# Patient Record
Sex: FEMALE | Race: BLACK OR AFRICAN AMERICAN | NOT HISPANIC OR LATINO | URBAN - METROPOLITAN AREA
[De-identification: names, ages, dates, MRNs, and addresses within clinical notes are randomized per-mention and may not be internally consistent; named-entity substitution may affect disease eponyms.]

---

## 2022-04-04 ENCOUNTER — INPATIENT (INPATIENT)
Facility: HOSPITAL | Age: 67
LOS: 0 days | Discharge: ANOTHER HOSPITAL | End: 2022-04-05
Attending: INTERNAL MEDICINE | Admitting: INTERNAL MEDICINE
Payer: COMMERCIAL

## 2022-04-04 VITALS
SYSTOLIC BLOOD PRESSURE: 195 MMHG | OXYGEN SATURATION: 100 % | WEIGHT: 139.99 LBS | DIASTOLIC BLOOD PRESSURE: 110 MMHG | TEMPERATURE: 98 F | RESPIRATION RATE: 17 BRPM | HEIGHT: 65 IN | HEART RATE: 66 BPM

## 2022-04-04 PROCEDURE — 99291 CRITICAL CARE FIRST HOUR: CPT

## 2022-04-04 PROCEDURE — 93010 ELECTROCARDIOGRAM REPORT: CPT

## 2022-04-04 NOTE — ED ADULT TRIAGE NOTE - WEIGHT IN LBS
Lots of fluids and rest. Ibuprofen and/or Tylenol for fever and discomfort. Take the Xofluza once today. No school/work until symptoms of fever and body aches have been gone for 24 hours without medications. Symptomatic care with warm salt water gargles, throat lozenges, honey, tea, lemon, chloraseptic spray, lots of fluids, cool humidified air, OTC tylenol and ibuprofen. Wash hands, cover cough. Avoid the elderly, young, immunocompromised and pregnant.      Patient Education     Influenza (Adult)    Influenza is also called the flu. It is a viral illness that affects the air passages of your lungs. It is different from the common cold. The flu can easily be passed from one to person to another. It may be spread through the air by coughing and sneezing. Or it can be spread by touching the sick person and then touching your own eyes, nose, or mouth.  The flu starts 1 to 3 days after you are exposed to the flu virus. It may last for 1 to 2 weeks but many people feel tired or fatigued for many weeks afterward. You usually don’t need to take antibiotics unless you have a complication. This might be an ear or sinus infection or pneumonia.  Symptoms of the flu may be mild or severe. They can include extreme tiredness (wanting to stay in bed all day), chills, fevers, muscle aches, soreness with eye movement, headache, and a dry, hacking cough.  Home care  Follow these guidelines when caring for yourself at home:  · Avoid being around cigarette smoke, whether yours or other people’s.  · Acetaminophen or ibuprofen will help ease your fever, muscle aches, and headache. Don’t give aspirin to anyone younger than 18 who has the flu. Aspirin can harm the liver.  · Nausea and loss of appetite are common with the flu. Eat light meals. Drink 6 to 8 glasses of liquids every day. Good choices are water, sport drinks, soft drinks without caffeine, juices, tea, and soup. Extra fluids will also help loosen secretions in your nose and  lungs.  · Over-the-counter cold medicines will not make the flu go away faster. But the medicines may help with coughing, sore throat, and congestion in your nose and sinuses. Don’t use a decongestant if you have high blood pressure.  · Stay home until your fever has been gone for at least 24 hours without using medicine to reduce fever.  Follow-up care  Follow up with your healthcare provider, or as advised, if you are not getting better over the next week.  If you are age 65 or older, talk with your provider about getting a pneumococcal vaccine every 5 years. You should also get this vaccine if you have chronic asthma or COPD. All adults should get a flu vaccine every fall. Ask your provider about this.  When to seek medical advice  Call your healthcare provider right away if any of these occur:  · Cough with lots of colored mucus (sputum) or blood in your mucus  · Chest pain, shortness of breath, wheezing, or trouble breathing  · Severe headache, or face, neck, or ear pain  · New rash with fever  · Fever of 100.4°F (38°C) or higher, or as directed by your healthcare provider  · Confusion, behavior change, or seizure  · Severe weakness or dizziness  · You get a new fever or cough after getting better for a few days  Date Last Reviewed: 1/1/2017  © 8787-7129 [employer]. [address] [place] [address], [place] [address]. All rights reserved. This information is not intended as a substitute for professional medical care. Always follow your healthcare professional's instructions.            139.9

## 2022-04-05 ENCOUNTER — TRANSCRIPTION ENCOUNTER (OUTPATIENT)
Age: 67
End: 2022-04-05

## 2022-04-05 ENCOUNTER — INPATIENT (INPATIENT)
Facility: HOSPITAL | Age: 67
LOS: 0 days | Discharge: HOME | DRG: 247 | End: 2022-04-06
Attending: INTERNAL MEDICINE | Admitting: INTERNAL MEDICINE
Payer: COMMERCIAL

## 2022-04-05 VITALS
DIASTOLIC BLOOD PRESSURE: 74 MMHG | HEART RATE: 59 BPM | RESPIRATION RATE: 16 BRPM | TEMPERATURE: 98 F | SYSTOLIC BLOOD PRESSURE: 120 MMHG | WEIGHT: 134.92 LBS | HEIGHT: 65 IN | OXYGEN SATURATION: 98 %

## 2022-04-05 VITALS
DIASTOLIC BLOOD PRESSURE: 74 MMHG | TEMPERATURE: 98 F | SYSTOLIC BLOOD PRESSURE: 123 MMHG | OXYGEN SATURATION: 98 % | HEART RATE: 72 BPM | RESPIRATION RATE: 18 BRPM

## 2022-04-05 DIAGNOSIS — Z90.49 ACQUIRED ABSENCE OF OTHER SPECIFIED PARTS OF DIGESTIVE TRACT: Chronic | ICD-10-CM

## 2022-04-05 DIAGNOSIS — R07.9 CHEST PAIN, UNSPECIFIED: ICD-10-CM

## 2022-04-05 DIAGNOSIS — I21.4 NON-ST ELEVATION (NSTEMI) MYOCARDIAL INFARCTION: ICD-10-CM

## 2022-04-05 DIAGNOSIS — I10 ESSENTIAL (PRIMARY) HYPERTENSION: ICD-10-CM

## 2022-04-05 LAB
ALBUMIN SERPL ELPH-MCNC: 4.2 G/DL — SIGNIFICANT CHANGE UP (ref 3.3–5)
ALP SERPL-CCNC: 122 U/L — HIGH (ref 40–120)
ALT FLD-CCNC: 32 U/L — SIGNIFICANT CHANGE UP (ref 12–78)
ANION GAP SERPL CALC-SCNC: 6 MMOL/L — SIGNIFICANT CHANGE UP (ref 5–17)
APTT BLD: 34.9 SEC — SIGNIFICANT CHANGE UP (ref 27.5–35.5)
APTT BLD: 79.4 SEC — HIGH (ref 27.5–35.5)
AST SERPL-CCNC: 28 U/L — SIGNIFICANT CHANGE UP (ref 15–37)
BASOPHILS # BLD AUTO: 0.05 K/UL — SIGNIFICANT CHANGE UP (ref 0–0.2)
BASOPHILS NFR BLD AUTO: 0.6 % — SIGNIFICANT CHANGE UP (ref 0–2)
BILIRUB SERPL-MCNC: 0.2 MG/DL — SIGNIFICANT CHANGE UP (ref 0.2–1.2)
BUN SERPL-MCNC: 16 MG/DL — SIGNIFICANT CHANGE UP (ref 7–23)
CALCIUM SERPL-MCNC: 9.3 MG/DL — SIGNIFICANT CHANGE UP (ref 8.5–10.1)
CHLORIDE SERPL-SCNC: 109 MMOL/L — HIGH (ref 96–108)
CO2 SERPL-SCNC: 25 MMOL/L — SIGNIFICANT CHANGE UP (ref 22–31)
CREAT SERPL-MCNC: 0.94 MG/DL — SIGNIFICANT CHANGE UP (ref 0.5–1.3)
EGFR: 67 ML/MIN/1.73M2 — SIGNIFICANT CHANGE UP
EOSINOPHIL # BLD AUTO: 0.1 K/UL — SIGNIFICANT CHANGE UP (ref 0–0.5)
EOSINOPHIL NFR BLD AUTO: 1.2 % — SIGNIFICANT CHANGE UP (ref 0–6)
FLUAV AG NPH QL: SIGNIFICANT CHANGE UP
FLUBV AG NPH QL: SIGNIFICANT CHANGE UP
GLUCOSE SERPL-MCNC: 105 MG/DL — HIGH (ref 70–99)
HCT VFR BLD CALC: 38 % — SIGNIFICANT CHANGE UP (ref 34.5–45)
HCT VFR BLD CALC: 38.9 % — SIGNIFICANT CHANGE UP (ref 34.5–45)
HGB BLD-MCNC: 12.4 G/DL — SIGNIFICANT CHANGE UP (ref 11.5–15.5)
HGB BLD-MCNC: 12.9 G/DL — SIGNIFICANT CHANGE UP (ref 11.5–15.5)
IMM GRANULOCYTES NFR BLD AUTO: 0.1 % — SIGNIFICANT CHANGE UP (ref 0–1.5)
INR BLD: 0.99 RATIO — SIGNIFICANT CHANGE UP (ref 0.88–1.16)
LYMPHOCYTES # BLD AUTO: 2.37 K/UL — SIGNIFICANT CHANGE UP (ref 1–3.3)
LYMPHOCYTES # BLD AUTO: 28.5 % — SIGNIFICANT CHANGE UP (ref 13–44)
MCHC RBC-ENTMCNC: 29.3 PG — SIGNIFICANT CHANGE UP (ref 27–34)
MCHC RBC-ENTMCNC: 29.5 PG — SIGNIFICANT CHANGE UP (ref 27–34)
MCHC RBC-ENTMCNC: 32.6 G/DL — SIGNIFICANT CHANGE UP (ref 32–36)
MCHC RBC-ENTMCNC: 33.2 G/DL — SIGNIFICANT CHANGE UP (ref 32–36)
MCV RBC AUTO: 88.8 FL — SIGNIFICANT CHANGE UP (ref 80–100)
MCV RBC AUTO: 89.8 FL — SIGNIFICANT CHANGE UP (ref 80–100)
MONOCYTES # BLD AUTO: 0.63 K/UL — SIGNIFICANT CHANGE UP (ref 0–0.9)
MONOCYTES NFR BLD AUTO: 7.6 % — SIGNIFICANT CHANGE UP (ref 2–14)
NEUTROPHILS # BLD AUTO: 5.17 K/UL — SIGNIFICANT CHANGE UP (ref 1.8–7.4)
NEUTROPHILS NFR BLD AUTO: 62 % — SIGNIFICANT CHANGE UP (ref 43–77)
NRBC # BLD: 0 /100 WBCS — SIGNIFICANT CHANGE UP (ref 0–0)
NRBC # BLD: 0 /100 WBCS — SIGNIFICANT CHANGE UP (ref 0–0)
NT-PROBNP SERPL-SCNC: 217 PG/ML — HIGH (ref 0–125)
PLATELET # BLD AUTO: 326 K/UL — SIGNIFICANT CHANGE UP (ref 150–400)
PLATELET # BLD AUTO: 340 K/UL — SIGNIFICANT CHANGE UP (ref 150–400)
POTASSIUM SERPL-MCNC: 4 MMOL/L — SIGNIFICANT CHANGE UP (ref 3.5–5.3)
POTASSIUM SERPL-SCNC: 4 MMOL/L — SIGNIFICANT CHANGE UP (ref 3.5–5.3)
PROT SERPL-MCNC: 8.1 GM/DL — SIGNIFICANT CHANGE UP (ref 6–8.3)
PROTHROM AB SERPL-ACNC: 11.8 SEC — SIGNIFICANT CHANGE UP (ref 10.5–13.4)
RBC # BLD: 4.23 M/UL — SIGNIFICANT CHANGE UP (ref 3.8–5.2)
RBC # BLD: 4.38 M/UL — SIGNIFICANT CHANGE UP (ref 3.8–5.2)
RBC # FLD: 13.1 % — SIGNIFICANT CHANGE UP (ref 10.3–14.5)
RBC # FLD: 13.2 % — SIGNIFICANT CHANGE UP (ref 10.3–14.5)
SARS-COV-2 RNA SPEC QL NAA+PROBE: SIGNIFICANT CHANGE UP
SODIUM SERPL-SCNC: 140 MMOL/L — SIGNIFICANT CHANGE UP (ref 135–145)
TROPONIN I, HIGH SENSITIVITY RESULT: 153.7 NG/L — HIGH
TROPONIN I, HIGH SENSITIVITY RESULT: 279 NG/L — HIGH
WBC # BLD: 6.83 K/UL — SIGNIFICANT CHANGE UP (ref 3.8–10.5)
WBC # BLD: 8.33 K/UL — SIGNIFICANT CHANGE UP (ref 3.8–10.5)
WBC # FLD AUTO: 6.83 K/UL — SIGNIFICANT CHANGE UP (ref 3.8–10.5)
WBC # FLD AUTO: 8.33 K/UL — SIGNIFICANT CHANGE UP (ref 3.8–10.5)

## 2022-04-05 PROCEDURE — ZZZZZ: CPT

## 2022-04-05 PROCEDURE — 93306 TTE W/DOPPLER COMPLETE: CPT | Mod: 26

## 2022-04-05 PROCEDURE — 93454 CORONARY ARTERY ANGIO S&I: CPT | Mod: 26,59

## 2022-04-05 PROCEDURE — 99223 1ST HOSP IP/OBS HIGH 75: CPT

## 2022-04-05 PROCEDURE — 71045 X-RAY EXAM CHEST 1 VIEW: CPT | Mod: 26

## 2022-04-05 PROCEDURE — 99221 1ST HOSP IP/OBS SF/LOW 40: CPT

## 2022-04-05 PROCEDURE — 99152 MOD SED SAME PHYS/QHP 5/>YRS: CPT | Mod: 59

## 2022-04-05 PROCEDURE — 93010 ELECTROCARDIOGRAM REPORT: CPT | Mod: 76

## 2022-04-05 PROCEDURE — 92928 PRQ TCAT PLMT NTRAC ST 1 LES: CPT | Mod: LD

## 2022-04-05 RX ORDER — ATORVASTATIN CALCIUM 80 MG/1
40 TABLET, FILM COATED ORAL AT BEDTIME
Refills: 0 | Status: DISCONTINUED | OUTPATIENT
Start: 2022-04-05 | End: 2022-04-05

## 2022-04-05 RX ORDER — METOPROLOL TARTRATE 50 MG
12.5 TABLET ORAL
Refills: 0 | Status: DISCONTINUED | OUTPATIENT
Start: 2022-04-05 | End: 2022-04-05

## 2022-04-05 RX ORDER — ASPIRIN/CALCIUM CARB/MAGNESIUM 324 MG
81 TABLET ORAL DAILY
Refills: 0 | Status: DISCONTINUED | OUTPATIENT
Start: 2022-04-05 | End: 2022-04-05

## 2022-04-05 RX ORDER — HEPARIN SODIUM 5000 [USP'U]/ML
INJECTION INTRAVENOUS; SUBCUTANEOUS
Qty: 25000 | Refills: 0 | Status: DISCONTINUED | OUTPATIENT
Start: 2022-04-05 | End: 2022-04-05

## 2022-04-05 RX ORDER — HEPARIN SODIUM 5000 [USP'U]/ML
3800 INJECTION INTRAVENOUS; SUBCUTANEOUS EVERY 6 HOURS
Refills: 0 | Status: DISCONTINUED | OUTPATIENT
Start: 2022-04-05 | End: 2022-04-05

## 2022-04-05 RX ORDER — TICAGRELOR 90 MG/1
180 TABLET ORAL ONCE
Refills: 0 | Status: COMPLETED | OUTPATIENT
Start: 2022-04-05 | End: 2022-04-05

## 2022-04-05 RX ORDER — SODIUM CHLORIDE 9 MG/ML
250 INJECTION INTRAMUSCULAR; INTRAVENOUS; SUBCUTANEOUS
Refills: 0 | Status: DISCONTINUED | OUTPATIENT
Start: 2022-04-05 | End: 2022-04-05

## 2022-04-05 RX ORDER — TICAGRELOR 90 MG/1
90 TABLET ORAL
Refills: 0 | Status: DISCONTINUED | OUTPATIENT
Start: 2022-04-05 | End: 2022-04-06

## 2022-04-05 RX ORDER — NIFEDIPINE 30 MG
60 TABLET, EXTENDED RELEASE 24 HR ORAL DAILY
Refills: 0 | Status: DISCONTINUED | OUTPATIENT
Start: 2022-04-05 | End: 2022-04-05

## 2022-04-05 RX ORDER — ATORVASTATIN CALCIUM 80 MG/1
1 TABLET, FILM COATED ORAL
Qty: 30 | Refills: 0
Start: 2022-04-05 | End: 2022-05-04

## 2022-04-05 RX ORDER — METOPROLOL TARTRATE 50 MG
12.5 TABLET ORAL
Qty: 0 | Refills: 0 | DISCHARGE
Start: 2022-04-05

## 2022-04-05 RX ORDER — ASPIRIN/CALCIUM CARB/MAGNESIUM 324 MG
1 TABLET ORAL
Qty: 0 | Refills: 0 | DISCHARGE
Start: 2022-04-05

## 2022-04-05 RX ORDER — ASPIRIN/CALCIUM CARB/MAGNESIUM 324 MG
1 TABLET ORAL
Qty: 90 | Refills: 3
Start: 2022-04-05 | End: 2023-03-30

## 2022-04-05 RX ORDER — ASPIRIN/CALCIUM CARB/MAGNESIUM 324 MG
324 TABLET ORAL ONCE
Refills: 0 | Status: COMPLETED | OUTPATIENT
Start: 2022-04-05 | End: 2022-04-05

## 2022-04-05 RX ORDER — ASPIRIN/CALCIUM CARB/MAGNESIUM 324 MG
81 TABLET ORAL DAILY
Refills: 0 | Status: DISCONTINUED | OUTPATIENT
Start: 2022-04-05 | End: 2022-04-06

## 2022-04-05 RX ORDER — TICAGRELOR 90 MG/1
1 TABLET ORAL
Qty: 60 | Refills: 0
Start: 2022-04-05 | End: 2022-05-04

## 2022-04-05 RX ORDER — HEPARIN SODIUM 5000 [USP'U]/ML
0 INJECTION INTRAVENOUS; SUBCUTANEOUS
Qty: 0 | Refills: 0 | DISCHARGE
Start: 2022-04-05

## 2022-04-05 RX ORDER — SODIUM CHLORIDE 9 MG/ML
1000 INJECTION INTRAMUSCULAR; INTRAVENOUS; SUBCUTANEOUS
Refills: 0 | Status: DISCONTINUED | OUTPATIENT
Start: 2022-04-05 | End: 2022-04-06

## 2022-04-05 RX ORDER — NIFEDIPINE 30 MG
1 TABLET, EXTENDED RELEASE 24 HR ORAL
Qty: 0 | Refills: 0 | DISCHARGE
Start: 2022-04-05

## 2022-04-05 RX ORDER — SODIUM CHLORIDE 9 MG/ML
250 INJECTION INTRAMUSCULAR; INTRAVENOUS; SUBCUTANEOUS ONCE
Refills: 0 | Status: COMPLETED | OUTPATIENT
Start: 2022-04-05 | End: 2022-04-05

## 2022-04-05 RX ORDER — NIFEDIPINE 30 MG
60 TABLET, EXTENDED RELEASE 24 HR ORAL DAILY
Refills: 0 | Status: DISCONTINUED | OUTPATIENT
Start: 2022-04-05 | End: 2022-04-06

## 2022-04-05 RX ORDER — ATORVASTATIN CALCIUM 80 MG/1
40 TABLET, FILM COATED ORAL AT BEDTIME
Refills: 0 | Status: DISCONTINUED | OUTPATIENT
Start: 2022-04-05 | End: 2022-04-06

## 2022-04-05 RX ORDER — NIFEDIPINE 30 MG
1 TABLET, EXTENDED RELEASE 24 HR ORAL
Qty: 30 | Refills: 0
Start: 2022-04-05 | End: 2022-05-04

## 2022-04-05 RX ORDER — ATORVASTATIN CALCIUM 80 MG/1
1 TABLET, FILM COATED ORAL
Qty: 0 | Refills: 0 | DISCHARGE
Start: 2022-04-05

## 2022-04-05 RX ORDER — HEPARIN SODIUM 5000 [USP'U]/ML
3800 INJECTION INTRAVENOUS; SUBCUTANEOUS ONCE
Refills: 0 | Status: COMPLETED | OUTPATIENT
Start: 2022-04-05 | End: 2022-04-05

## 2022-04-05 RX ORDER — METOPROLOL TARTRATE 50 MG
12.5 TABLET ORAL
Refills: 0 | Status: DISCONTINUED | OUTPATIENT
Start: 2022-04-05 | End: 2022-04-06

## 2022-04-05 RX ADMIN — Medication 12.5 MILLIGRAM(S): at 17:40

## 2022-04-05 RX ADMIN — SODIUM CHLORIDE 250 MILLILITER(S): 9 INJECTION INTRAMUSCULAR; INTRAVENOUS; SUBCUTANEOUS at 12:27

## 2022-04-05 RX ADMIN — Medication 81 MILLIGRAM(S): at 16:52

## 2022-04-05 RX ADMIN — HEPARIN SODIUM 750 UNIT(S)/HR: 5000 INJECTION INTRAVENOUS; SUBCUTANEOUS at 04:59

## 2022-04-05 RX ADMIN — HEPARIN SODIUM 700 UNIT(S)/HR: 5000 INJECTION INTRAVENOUS; SUBCUTANEOUS at 10:51

## 2022-04-05 RX ADMIN — HEPARIN SODIUM 750 UNIT(S)/HR: 5000 INJECTION INTRAVENOUS; SUBCUTANEOUS at 02:36

## 2022-04-05 RX ADMIN — SODIUM CHLORIDE 75 MILLILITER(S): 9 INJECTION INTRAMUSCULAR; INTRAVENOUS; SUBCUTANEOUS at 16:54

## 2022-04-05 RX ADMIN — TICAGRELOR 180 MILLIGRAM(S): 90 TABLET ORAL at 10:47

## 2022-04-05 RX ADMIN — HEPARIN SODIUM 3800 UNIT(S): 5000 INJECTION INTRAVENOUS; SUBCUTANEOUS at 02:36

## 2022-04-05 RX ADMIN — HEPARIN SODIUM 750 UNIT(S)/HR: 5000 INJECTION INTRAVENOUS; SUBCUTANEOUS at 07:13

## 2022-04-05 RX ADMIN — ATORVASTATIN CALCIUM 40 MILLIGRAM(S): 80 TABLET, FILM COATED ORAL at 21:07

## 2022-04-05 RX ADMIN — TICAGRELOR 90 MILLIGRAM(S): 90 TABLET ORAL at 17:40

## 2022-04-05 RX ADMIN — HEPARIN SODIUM 750 UNIT(S)/HR: 5000 INJECTION INTRAVENOUS; SUBCUTANEOUS at 05:25

## 2022-04-05 RX ADMIN — Medication 324 MILLIGRAM(S): at 02:36

## 2022-04-05 RX ADMIN — Medication 12.5 MILLIGRAM(S): at 09:19

## 2022-04-05 RX ADMIN — Medication 60 MILLIGRAM(S): at 04:04

## 2022-04-05 NOTE — ED PROVIDER NOTE - CRITICAL CARE ATTENDING CONTRIBUTION TO CARE
Upon my evaluation, this patient had a high probability of imminent or lifethreatening deterioration due to NSTEMI/ACS, elevated troponin, EKG changes TWI, which required my direct attention, intervention, and personal management.     I have personally provided 34 minutes of critical care time exclusive of time spent on separately billable procedures. Time includes review of laboratory data, radiology results, discussion with consultants, and monitoring for potential decompensation. Interventions were performed as documented above.    - Mathew Cardoso MD, Emergency Medicine and Medical Toxicology Attending.

## 2022-04-05 NOTE — DISCHARGE NOTE PROVIDER - HOSPITAL COURSE
Patient is a 66F with no PMH who presents to the ED for chest pain.  Patient states that she was trying to goto sleep when she developed 6/10, substernal CP.  Pain was nonradiational, lasted about 10 minutes, not associated with palpitations, dyspnea, dizziness, lightheadedness, diaphoresis, or syncope.  Denies similar symptoms in the past.  Has had no recurrence of the pain.  Patient currently has no complaints.  Takes no daily medications, NKDA, SHx: cholecystectomy  Hypertensive in ED - 195/110.  Labs show mildly elevated troponin level. Patient received aspirin 324mg PO x1 and started on heparin gtt. EKG with TWI. troponin elevated. Cardiology consulted. for transfer to [facility] for cardiac cath with Dr. Hector. brilinta 180mg x1 load, atorvastatin 40mg PO QHS, metoprolol 12.5mg PO BID (HR60) started.    Patient is a 66F with no PMH who presents to the ED for chest pain.  Patient states that she was trying to goto sleep when she developed 6/10, substernal CP.  Pain was nonradiational, lasted about 10 minutes, not associated with palpitations, dyspnea, dizziness, lightheadedness, diaphoresis, or syncope.  Denies similar symptoms in the past.  Has had no recurrence of the pain.  Patient currently has no complaints.  Takes no daily medications, NKDA, SHx: cholecystectomy  Hypertensive in ED - 195/110.  Labs show mildly elevated troponin level. Patient received aspirin 324mg PO x1 and started on heparin gtt. EKG with TWI. troponin elevated. Cardiology consulted. for transfer to [facility] for cardiac cath with Dr. Hector. brilinta 180mg x1 load, atorvastatin 40mg PO QHS, metoprolol 12.5mg PO BID (HR60) started.   ASA 324mg given at 2AM.   Heparin bolus 3800units.

## 2022-04-05 NOTE — PATIENT PROFILE ADULT - FALL HARM RISK - TYPE OF ASSESSMENT
Birth Control Pills Pregnancy And Lactation Text: This medication should be avoided if pregnant and for the first 30 days post-partum. Erythromycin Counseling:  I discussed with the patient the risks of erythromycin including but not limited to GI upset, allergic reaction, drug rash, diarrhea, increase in liver enzymes, and yeast infections. Spironolactone Pregnancy And Lactation Text: This medication can cause feminization of the male fetus and should be avoided during pregnancy. The active metabolite is also found in breast milk. High Dose Vitamin A Pregnancy And Lactation Text: High dose vitamin A therapy is contraindicated during pregnancy and breast feeding. Azithromycin Pregnancy And Lactation Text: This medication is considered safe during pregnancy and is also secreted in breast milk. Admission Topical Retinoid counseling:  Patient advised to apply a pea-sized amount only at bedtime and wait 30 minutes after washing their face before applying.  If too drying, patient may add a non-comedogenic moisturizer. The patient verbalized understanding of the proper use and possible adverse effects of retinoids.  All of the patient's questions and concerns were addressed. Topical Clindamycin Pregnancy And Lactation Text: This medication is Pregnancy Category B and is considered safe during pregnancy. It is unknown if it is excreted in breast milk. Dapsone Counseling: I discussed with the patient the risks of dapsone including but not limited to hemolytic anemia, agranulocytosis, rashes, methemoglobinemia, kidney failure, peripheral neuropathy, headaches, GI upset, and liver toxicity.  Patients who start dapsone require monitoring including baseline LFTs and weekly CBCs for the first month, then every month thereafter.  The patient verbalized understanding of the proper use and possible adverse effects of dapsone.  All of the patient's questions and concerns were addressed. Erythromycin Pregnancy And Lactation Text: This medication is Pregnancy Category B and is considered safe during pregnancy. It is also excreted in breast milk. Tetracycline Counseling: Patient counseled regarding possible photosensitivity and increased risk for sunburn.  Patient instructed to avoid sunlight, if possible.  When exposed to sunlight, patients should wear protective clothing, sunglasses, and sunscreen.  The patient was instructed to call the office immediately if the following severe adverse effects occur:  hearing changes, easy bruising/bleeding, severe headache, or vision changes.  The patient verbalized understanding of the proper use and possible adverse effects of tetracycline.  All of the patient's questions and concerns were addressed. Patient understands to avoid pregnancy while on therapy due to potential birth defects. Include Pregnancy/Lactation Warning?: No Minocycline Counseling: Patient advised regarding possible photosensitivity and discoloration of the teeth, skin, lips, tongue and gums.  Patient instructed to avoid sunlight, if possible.  When exposed to sunlight, patients should wear protective clothing, sunglasses, and sunscreen.  The patient was instructed to call the office immediately if the following severe adverse effects occur:  hearing changes, easy bruising/bleeding, severe headache, or vision changes.  The patient verbalized understanding of the proper use and possible adverse effects of minocycline.  All of the patient's questions and concerns were addressed. Topical Retinoid Pregnancy And Lactation Text: This medication is Pregnancy Category C. It is unknown if this medication is excreted in breast milk. Topical Sulfur Applications Counseling: Topical Sulfur Counseling: Patient counseled that this medication may cause skin irritation or allergic reactions.  In the event of skin irritation, the patient was advised to reduce the amount of the drug applied or use it less frequently.   The patient verbalized understanding of the proper use and possible adverse effects of topical sulfur application.  All of the patient's questions and concerns were addressed. Bactrim Counseling:  I discussed with the patient the risks of sulfa antibiotics including but not limited to GI upset, allergic reaction, drug rash, diarrhea, dizziness, photosensitivity, and yeast infections.  Rarely, more serious reactions can occur including but not limited to aplastic anemia, agranulocytosis, methemoglobinemia, blood dyscrasias, liver or kidney failure, lung infiltrates or desquamative/blistering drug rashes. Dapsone Pregnancy And Lactation Text: This medication is Pregnancy Category C and is not considered safe during pregnancy or breast feeding. Tetracycline Pregnancy And Lactation Text: This medication is Pregnancy Category D and not consider safe during pregnancy. It is also excreted in breast milk. Isotretinoin Counseling: Patient should get monthly blood tests, not donate blood, not drive at night if vision affected, not share medication, and not undergo elective surgery for 6 months after tx completed. Side effects reviewed, pt to contact office should one occur. Tazorac Counseling:  Patient advised that medication is irritating and drying.  Patient may need to apply sparingly and wash off after an hour before eventually leaving it on overnight.  The patient verbalized understanding of the proper use and possible adverse effects of tazorac.  All of the patient's questions and concerns were addressed. Detail Level: Zone Topical Sulfur Applications Pregnancy And Lactation Text: This medication is Pregnancy Category C and has an unknown safety profile during pregnancy. It is unknown if this topical medication is excreted in breast milk. Bactrim Pregnancy And Lactation Text: This medication is Pregnancy Category D and is known to cause fetal risk.  It is also excreted in breast milk. Doxycycline Counseling:  Patient counseled regarding possible photosensitivity and increased risk for sunburn.  Patient instructed to avoid sunlight, if possible.  When exposed to sunlight, patients should wear protective clothing, sunglasses, and sunscreen.  The patient was instructed to call the office immediately if the following severe adverse effects occur:  hearing changes, easy bruising/bleeding, severe headache, or vision changes.  The patient verbalized understanding of the proper use and possible adverse effects of doxycycline.  All of the patient's questions and concerns were addressed. Isotretinoin Pregnancy And Lactation Text: This medication is Pregnancy Category X and is considered extremely dangerous during pregnancy. It is unknown if it is excreted in breast milk. Benzoyl Peroxide Counseling: Patient counseled that medicine may cause skin irritation and bleach clothing.  In the event of skin irritation, the patient was advised to reduce the amount of the drug applied or use it less frequently.   The patient verbalized understanding of the proper use and possible adverse effects of benzoyl peroxide.  All of the patient's questions and concerns were addressed. Tazorac Pregnancy And Lactation Text: This medication is not safe during pregnancy. It is unknown if this medication is excreted in breast milk. Spironolactone Counseling: Patient advised regarding risks of diarrhea, abdominal pain, hyperkalemia, birth defects (for female patients), liver toxicity and renal toxicity. The patient may need blood work to monitor liver and kidney function and potassium levels while on therapy. The patient verbalized understanding of the proper use and possible adverse effects of spironolactone.  All of the patient's questions and concerns were addressed. Birth Control Pills Counseling: Birth Control Pill Counseling: I discussed with the patient the potential side effects of OCPs including but not limited to increased risk of stroke, heart attack, thrombophlebitis, deep venous thrombosis, hepatic adenomas, breast changes, GI upset, headaches, and depression.  The patient verbalized understanding of the proper use and possible adverse effects of OCPs. All of the patient's questions and concerns were addressed. Doxycycline Pregnancy And Lactation Text: This medication is Pregnancy Category D and not consider safe during pregnancy. It is also excreted in breast milk but is considered safe for shorter treatment courses. Benzoyl Peroxide Pregnancy And Lactation Text: This medication is Pregnancy Category C. It is unknown if benzoyl peroxide is excreted in breast milk. Azithromycin Counseling:  I discussed with the patient the risks of azithromycin including but not limited to GI upset, allergic reaction, drug rash, diarrhea, and yeast infections. High Dose Vitamin A Counseling: Side effects reviewed, pt to contact office should one occur. Topical Clindamycin Counseling: Patient counseled that this medication may cause skin irritation or allergic reactions.  In the event of skin irritation, the patient was advised to reduce the amount of the drug applied or use it less frequently.   The patient verbalized understanding of the proper use and possible adverse effects of clindamycin.  All of the patient's questions and concerns were addressed.

## 2022-04-05 NOTE — H&P ADULT - NSICDXFAMILYHX_GEN_ALL_CORE_FT
<<-----Click on this checkbox to enter Pre-Op Dx
FAMILY HISTORY:  No pertinent family history in first degree relatives

## 2022-04-05 NOTE — H&P CARDIOLOGY - SOURCE
note from [facility]/Patient/Other
Patient asked questions/Verbalized Understanding/Simple: Patient demonstrates quick and easy understanding

## 2022-04-05 NOTE — DISCHARGE NOTE PROVIDER - NSDCMRMEDTOKEN_GEN_ALL_CORE_FT
aspirin 81 mg oral delayed release tablet: 1 tab(s) orally once a day  hosp  atorvastatin 40 mg oral tablet: 1 tab(s) orally once a day (at bedtime)  hosp  Brilinta (ticagrelor) 90 mg oral tablet: 1 tab(s) orally 2 times a day   metoprolol: 12.5 milligram(s) orally 2 times a day  hosp  NIFEdipine 60 mg oral tablet, extended release: 1 tab(s) orally once a day  hosp   aspirin 81 mg oral delayed release tablet: 1 tab(s) orally once a day  hosp  atorvastatin 40 mg oral tablet: 1 tab(s) orally once a day (at bedtime)  hosp  Brilinta (ticagrelor) 90 mg oral tablet: 1 tab(s) orally 2 times a day MDD:take one tablet 2 times a day  metoprolol: 12.5 milligram(s) orally 2 times a day  hosp  NIFEdipine 60 mg oral tablet, extended release: 1 tab(s) orally once a day  hosp   aspirin 81 mg oral delayed release tablet: 1 tab(s) orally once a day  hosp  atorvastatin 40 mg oral tablet: 1 tab(s) orally once a day (at bedtime)  hosp  Brilinta (ticagrelor) 90 mg oral tablet: 1 tab(s) orally 2 times a day MDD:take one tablet 2 times a day  metoprolol tartrate 25 mg oral tablet: 0.5 tab(s) orally 2 times a day MDD:2  NIFEdipine 60 mg oral tablet, extended release: 1 tab(s) orally once a day  hosp

## 2022-04-05 NOTE — H&P ADULT - NSHPLABSRESULTS_GEN_ALL_CORE
Recent Vitals  T(C): 36.8 (04-05-22 @ 02:35), Max: 36.8 (04-05-22 @ 02:35)  HR: 59 (04-05-22 @ 02:35) (59 - 68)  BP: 175/90 (04-05-22 @ 02:35) (170/93 - 195/110)  RR: 15 (04-05-22 @ 02:35) (15 - 18)  SpO2: 99% (04-05-22 @ 02:35) (99% - 100%)                        12.4   8.33  )-----------( 340      ( 05 Apr 2022 01:08 )             38.0     04-05    140  |  109<H>  |  16  ----------------------------<  105<H>  4.0   |  25  |  0.94    Ca    9.3      05 Apr 2022 01:08    TPro  8.1  /  Alb  4.2  /  TBili  0.2  /  DBili  x   /  AST  28  /  ALT  32  /  AlkPhos  122<H>  04-05    PT/INR - ( 05 Apr 2022 01:08 )   PT: 11.8 sec;   INR: 0.99 ratio         PTT - ( 05 Apr 2022 01:08 )  PTT:34.9 sec  LIVER FUNCTIONS - ( 05 Apr 2022 01:08 )  Alb: 4.2 g/dL / Pro: 8.1 gm/dL / ALK PHOS: 122 U/L / ALT: 32 U/L / AST: 28 U/L / GGT: x               Home Medications:

## 2022-04-05 NOTE — ED PROVIDER NOTE - PROGRESS NOTE DETAILS
no current chest pain, shortness of breath, nausea/vomiting, abdominal pain;  - EKG showing TWI in V2-3, I, AVL. Trop (+), BNP (+). CXR normal. Treated w/ ASA 324mg and heparin for NSTEMI. currently normal vital signs, well appearing.

## 2022-04-05 NOTE — DISCHARGE NOTE PROVIDER - NSDCCPTREATMENT_GEN_ALL_CORE_FT
PRINCIPAL PROCEDURE  Procedure: Left heart cardiac catheterization  Findings and Treatment: 4/5 s/p ALYSHA x2 to LAD via RRA       PRINCIPAL PROCEDURE  Procedure: Left heart cardiac catheterization  Findings and Treatment: 4/5 s/p ALYSHA x2 to LAD via RRA No heavy lifting for 2 weeks, no strenuous activity  ( pushing/ pulling) no driving for x 2 days,  you may shower 24 hours following procedure but no bathing or swimming for x1  week, no strenuous sex for x 1 week & follow up with your cardiologist in 1-2 week

## 2022-04-05 NOTE — DISCHARGE NOTE PROVIDER - NSDCCPCAREPLAN_GEN_ALL_CORE_FT
PRINCIPAL DISCHARGE DIAGNOSIS  Diagnosis: NSTEMI (non-ST elevation myocardial infarction)  Assessment and Plan of Treatment: transfer to [facility] for cardiac cath. statin, bb, brilinta load, aspirin 325.

## 2022-04-05 NOTE — DISCHARGE NOTE PROVIDER - CARE PROVIDER_API CALL
Demarcus Hector)  Cardiology; Interventional Cardiology  [address], [place] [id number]  Phone: [phone]  Fax: [phone]  Follow Up Time:

## 2022-04-05 NOTE — DISCHARGE NOTE PROVIDER - HOSPITAL COURSE
This is a is a 67 yo AA F with no PMH who presents to [facility] ED today for c/c of  chest pain.  Patient states that she was trying to goto sleep when she developed 6/10, substernal CP.  Pain was nonradiational, lasted about 10 minutes, not associated with palpitations, dyspnea, dizziness, lightheadedness, diaphoresis, or syncope.  Denies similar symptoms in the past.  Has had no recurrence of the pain.  Takes no daily medications. SHx: cholecystectomy.  Hypertensive in ED - 195/110. Found to have NSTEMI,  + Trops ( 153.7=> 279.0).Loaded with ASA 325mg and ticagrelor 180mg once; started on  metoprolol tartrate 12.5 mg BID Transferred to [facility] for LHC for further evaluation of CAD.  On arrival to [facility], pt remains asymptomatic.  4/5 s/p LHC ALYSHA x2 to LAD via RRA, site without hematoma. Patient without complaint overnight, hemodynamically stable. Pending discharge tomorrow

## 2022-04-05 NOTE — CONSULT NOTE ADULT - ASSESSMENT
66F with NSTEMI. Currently without chest pain.    s/p asa 325mg    -cont asa 81mg daily  -cont hep gtt  -start ticagrelor 180mg once then 90mg BID  -start metoprolol tartrate 12.5mbBID  -check TTE  -Transfer to [facility] for LHC

## 2022-04-05 NOTE — ED ADULT NURSE NOTE - OBJECTIVE STATEMENT
Patient presents to ED awake, alert and oriented x4 c/o substernal chest pain after 11 pm tonight , states it felt like a discomfort. Currently denies pain.  Placed on cardiac monitor. Denies PMH, no PSH, NKA.

## 2022-04-05 NOTE — H&P ADULT - HISTORY OF PRESENT ILLNESS
Patient is a 66F with no PMH who presents to the ED for chest pain.  Patient states that she was trying to goto sleep when she developed 6/10, substernal CP.  Pain was nonradiational, lasted about 10 minutes, not associated with palpitations, dyspnea, dizziness, lightheadedness, diaphoresis, or syncope.  Denies similar symptoms in the past.  Has had no recurrence of the pain.  Patient currently has no complaints.  Takes no daily medications, NKDA, SHx: cholecystectomy  Hypertensive in ED - 195/110.  Labs show mildly elevated troponin level.  Will admit to tele.

## 2022-04-05 NOTE — PATIENT PROFILE ADULT - FALL HARM RISK - HARM RISK INTERVENTIONS

## 2022-04-05 NOTE — ED PROVIDER NOTE - CADM POA PRESS ULCER
Patient:   AMI ESPAÑA            MRN: [id number]            FIN: [id number]               Age:   57 years     Sex:  Female     :  1961   Associated Diagnoses:   None   Author:   Kristin Quigley NP      Basic Information   History source: Patient.   Arrival mode: Private vehicle, walking.   History limitation: None.      History of Present Illness   The patient presents with lower extremity pain.  The onset was 1  weeks ago.  The course/duration of symptoms is constant.  Type of injury: none.  Location: Left calf. The character of symptoms is pain.  The degree at present is 5 /10.  There are exacerbating factors including movement, weight bearing and walking.  The relieving factor is none.  Risk factors consist of diabetes mellitus, hypertension, recent travel, not deep vein thrombosis, not recent surgery and not recent hospitalization.  Prior episodes: none.  Therapy today: none.  Associated symptoms: denies fever, denies chills, denies rash, denies chest pain and denies shortness of breath.     [place]      Review of Systems   Constitutional symptoms:  No fever,    Skin symptoms:  No rash,    Eye symptoms:  No recent vision problems,    ENMT symptoms:  No sore throat,    Respiratory symptoms:  No shortness of breath,    Cardiovascular symptoms:  No chest pain,    Gastrointestinal symptoms:  No abdominal pain,    Genitourinary symptoms:  No dysuria,    Musculoskeletal symptoms:  Negative except as documented in HPI.   Neurologic symptoms:  No headache,              Additional review of systems information: All other systems reviewed and otherwise negative.      Health Status   Allergies:    Allergic Reactions (Selected)  NKA.   Menstrual history: Hysterectomy.      Past Medical/ Family/ Social History      Medical history   Cardiovascular: hypertension, no myocardial infarction, hyperlipidemia.   Respiratory: no asthma.   Endocrine: diabetes type 2.   Neurological: no cerebral vascular accident.    Surgical history: Hysterectomy, tonsillectomy.   Social history: Alcohol use: Occasionally, Tobacco use: Denies, Drug use: Denies.     Med:   -levothyroxine 75 mcg  HCTZ 25 mg   amolidipine 5 mg  Metformin 850 mg  colcrys 0.6 mg  Presvastatin 20 mg  Trazadone 50 mg  Lisinopril 40 mg  Omeprazole 20 mg      Physical Examination               Vital Signs   Vital Signs   6/10/2019 11:47          Temperature Tympanic      97.8 F                             Peripheral Pulse Rate     108 bpm  HI                             Respiratory Rate          16 br/min                             Systolic Blood Pressure   112 mmHg                             Diastolic Blood Pressure  81 mmHg                             Mean Arterial Pressure    91 mmHg                             Oxygen Saturation         98 %  .               Per nurse's notes.   Measurements   6/10/2019 11:47          Height                    170 cm                             Weight                    107.2 kg  .   Pulse Ox > 95% on Room Air which is normal for this patient.   Vital Signs were reviewed.   General:  Alert, no acute distress.    Skin:  Warm, dry.    Head:  Normocephalic, atraumatic.    Neck:  Supple, trachea midline.    Eye:  Normal conjunctiva.   Ears, nose, mouth and throat:  Oral mucosa moist.   Cardiovascular:  Regular rate and rhythm, S1, S2, negative patria's signs but some tenderness to calf muscle with palpation. , Arterial pulses: Left, dorsalis pedis, 2+, Capillary refill: < 2 seconds.    Respiratory:  Lungs are clear to auscultation, respirations are non-labored, Symmetrical chest wall expansion.    Gastrointestinal:  Soft, Nontender, Non distended.    Musculoskeletal:  Normal ROM, no swelling, no deformity, Lower extremity: Left, calf, tenderness, range of motion normal, not aligned, no swelling, no erythema, no ecchymosis, Ankle/foot: Left, lateral, medial, malleolus, aligned, range of motion normal, no tenderness, no swelling, no  erythema.    Neurological:  Alert and oriented to person, place, time, and situation, No focal neurological deficit observed.    Psychiatric:  Cooperative, appropriate mood & affect.       Medical Decision Making   Differential Diagnosis:  Deep vein thrombosis, superficial thrombophlebitis, cellulitis, gout, strain.    Rationale:  58 yo female, hx significant for htn and DM; 2 weeks ago was getting out of her car after driving to [place], [place] approximately 2 hours away, got the car and felt a pulling pain to the calf.  This pain has not improved over the course of 2 weeks but also has not worsened.  She denies swelling or redness, no tactile warmth.  Denies hx of DVT; denies CP, SOB or dififculty breathing.  Full painless ROM of knee and ankle.  Negaitve patria's sign but palpation over calf muscle with some discomfort.  No overt signs of cellulitis or other infectious process, will evaluate for DVT.  Doppler US of LLE with acute, occlusive clot in one of the two posterior tibial veins in the upper aspect of the left calf, over a length of approximately 6.6 cm.  Results d/w patient's PCP, request dc on Eliquis with follow-up in office tomorrow.  Pt initially noted to have HR Of 108, while on cardiac monitor HR 70-80's.  Pt denies SOB of CP.  ECG NSR, no ectopy or ST elevation.  All results relayed to patient.  She is agreeable to dc home vs hospital admission. Case d/w ER attending, Dr. Whitt, he independenlty evaluated pt; ageeable to ER course and dc on Eliquiz.  Multiple etiologies considered.  H&P and ER workup/evaluation suggest LLE DVT.  I explained ER workup is not exhaustive and she should return to PCP for further evaluation and management. Return precautions discussed. Verbalized understanding. Prescription for Eliquiz provided; 1st dose of medication given in ER prior to dc.  Copy of all results provided.       .   Doppler ultrasound    Report  US left lower extremity venous Doppler.    TECHNIQUE:  Real-time compression, color flow and Doppler augmentation  evaluation of deep veins in the left lower extremity was performed.    CLINICAL HISTORY: calf pain    FINDINGS: Right common femoral vein is unremarkable.    IMPRESSION: The patient has acute, occlusive clot in one of two  posterior tibial veins in the upper aspect of the left calf, over a  length of approximately 6.6 cm.  Remaining deep veins in the left  lower extremity are unremarkable without additional findings of deep  vein thrombosis.  Remaining veins are compressible, with appropriate  augmentation of venous flow.     .      Reexamination/ Reevaluation   Discussed case with Dr. Chicas, PCP, request start patient on Eliquis and follow-up in office.  Case discussed with ER attending Dr. Whitt, he independently evaluated patient agreeable to ER course and DC home with follow-up.   Vital signs   results included from flowsheet : Vital Signs   6/10/2019 14:31          Heart Rate Monitored      86 bpm  (Modified)                            Respiratory Rate          20 br/min                             Systolic Blood Pressure   126 mmHg                             Diastolic Blood Pressure  89 mmHg                             Mean Arterial Pressure    101 mmHg                             Oxygen Saturation         94 %    6/10/2019 14:00          Heart Rate Monitored      85 bpm  (Modified)                            Respiratory Rate          18 br/min                             Systolic Blood Pressure   118 mmHg                             Diastolic Blood Pressure  71 mmHg                             Mean Arterial Pressure    87 mmHg                             Oxygen Saturation         96 %        Impression and Plan   Diagnosis   LLE DVT   Plan   Condition: Stable.    Disposition: Discharged: to home.    Prescriptions: Launch prescriptions   Pharmacy:  Eliquis 5 mg oral tablet (Prescribe): 10 mg, 2 tab, PO, BID, for 7 day, 28 tab, 0 Refill(s).     Patient was given the following educational materials: Deep Vein Thrombosis.    Follow up with: Follow up with primary care provider Within 1 to 2 days   FOLLOW-UP WITH PRIMARY CARE PHYSICIAN    RETURN TO EMERGENCY DEPARTMENT WITH NEW OR WORSENING SYMPTOMS    -Drink plenty of fluids  -Rest  -Take Tylenol or ibuprofen for pain/fever    If you do not have a primary care doctor need assistance finding a doctor call 1-800-3-[facility] for assistance.     You have been given the first dose of Eliquis in the emergency department.  Begin home dosing tomorrow.    .    Counseled: Patient, Family, Regarding diagnosis, Regarding diagnostic results, Regarding treatment plan, Regarding prescription, Patient indicated understanding of instructions.             Electronically Signed On 06.11.2019 09:59  ___________________________________________________   Kristin uQigley NP     No

## 2022-04-05 NOTE — DISCHARGE NOTE PROVIDER - PROVIDER TOKENS
PROVIDER:[TOKEN:[[id number]],FOLLOWUP:[2 weeks],ESTABLISHEDPATIENT:[T]] PROVIDER:[TOKEN:[[id number]],FOLLOWUP:[2 weeks],ESTABLISHEDPATIENT:[T]],PROVIDER:[TOKEN:[[id number]],FOLLOWUP:[1-3 days]]

## 2022-04-05 NOTE — H&P CARDIOLOGY - HISTORY OF PRESENT ILLNESS
This is a is a 65 yo AA F with no PMH who presents to [facility] ED today for c/c of  chest pain.  Patient states that she was trying to goto sleep when she developed 6/10, substernal CP.  Pain was nonradiational, lasted about 10 minutes, not associated with palpitations, dyspnea, dizziness, lightheadedness, diaphoresis, or syncope.  Denies similar symptoms in the past.  Has had no recurrence of the pain.  Takes no daily medications. SHx: cholecystectomy.  Hypertensive in ED - 195/110. Found to have NSTEMI,  + Trops ( 153.7=> 279.0).Loaded with ASA 325mg and ticagrelor 180mg once; started on  metoprolol tartrate 12.5 mg BID Transferred to [facility] for LHC for further evaluation of CAD.  On arrival to [facility], pt remains asymptomatic.       < from: TTE Echo Complete w/o Contrast w/ Doppler (04.05.22 @ 10:01) >    PHYSICIAN INTERPRETATION:  Left Ventricle: The left ventricular internal cavity size is normal.   Increased relative wall thickness with normal mass index consistent with   left ventricular concentric remodeling.  Global LV systolic function was normal. Left ventricular ejection   fraction, by visual estimation, is 55 to 60%. Normal segmental left   ventricular systolic function. E/A reversal consistent with reduced LV   compliance.  Right Ventricle: Normal right ventricular size and function.  Left Atrium: The left atrium is normal in size.  Right Atrium: The right atrium is normal in size.  Pericardium: There is no evidence of pericardial effusion.  Mitral Valve: Thickening of the anterior mitral valve leaflet. Trace   mitral valve regurgitation is seen.  Tricuspid Valve: The tricuspid valve is not well seen. Mild tricuspid   regurgitation is visualized.  Aortic Valve: The aortic valve was not well visualized. Peak transaortic   gradient equals 8.9 mmHg, mean transaortic gradient equals 4.7 mmHg, the   calculated aortic valve area equals 3.11 cm² by the continuity equation   consistent with normally opening aortic valve. The aortic valvemean   gradient is 4.7 mmHg consistent with normally opening aortic valve.   Trivial aortic valve regurgitation is seen.  Pulmonic Valve: The pulmonic valve was not well visualized. Mild pulmonic   valve regurgitation.  Aorta: Aortic root measured atSinus of Valsalva is normal.  Pulmonary Artery: The pulmonary artery is not well seen.      Summary:   1. Left ventricular ejection fraction, by visual estimation, is 55 to   60%.   2. Normal global left ventricular systolic function.   3. E/A reversal consistent with reduced LV compliance.   4. Increased relative wall thickness with normal mass index consistent   with left ventricular concentric remodeling.   5. The aortic valve mean gradient is 4.7 mmHg consistent with normally   opening aorticvalve.    < end of copied text >

## 2022-04-05 NOTE — ED PROVIDER NOTE - CLINICAL SUMMARY MEDICAL DECISION MAKING FREE TEXT BOX
Patient presenting with chest pain, with a history and exam suggestive of ACS/NSTEMI rule out; No evidence of volume overload or shock on exam. EKG without signs of active ischemia. EKG without evidence of acute STEMI. Considered Differential diagnosis: acute DVT/pulmonary embolism  ,  pneumothorax,  thoracic aortic dissection,  cardiac effusion or tamponade,  pneumonia,  intra-abdominal pathology,  traumatic chest pathology; but not consistent with presentation and unlikely.  PLAN:   - CBC, CMP, Troponin, PT/PTT/INR, COVID-19 rule out.  - EKG, CXR  - Cardiac monitoring  - Pain control, 325 mg aspirin, serial re-assessment  - HEART SCORE calculation   -  Heparin drip        Overall, ACS/NSTEMI is being considered given the patient's high-risk features: XXX, history and physical exam.

## 2022-04-05 NOTE — ED PROVIDER NOTE - PHYSICAL EXAMINATION
VITAL SIGNS: I have reviewed nursing notes and confirm.   GEN: Well-developed; well-nourished; in no acute distress. Speaking full sentences.  SKIN: Warm, pink, no rash, no diaphoresis, no cyanosis, well perfused.   HEAD: Normocephalic; atraumatic. No scalp lacerations, no abrasions.  NECK: Supple; non tender.   EYES: Pupils 3mm equal, round, reactive to light and accomodation, conjunctiva and sclera clear. Extra-ocular movements intact bilaterally.  ENT: No nasal discharge; airway clear. Trachea is midline. ORAL: No oropharyngeal exudates or erythema. Normal dentition.  CV: Regular rate and rhythm. S1, S2 normal; no murmurs, gallops, or rubs. No lower extremity pitting edema bilaterally. Capillary refill < 2 seconds throughout. Distal pulses intact 2+ throughout.  RESP: CTA bilaterally. No wheezes, rales, or rhonchi.   ABD: Normal bowel sounds, soft, non-distended, non-tender, no rebound, no guarding, no rigidity, no hepatosplenomegaly. No CVA tenderness bilaterally.  MSK: Normal range of motion and movement of all 4 extremities. No joint or muscular pain throughout. No clubbing.   BACK: No thoracolumbar midline or paravertebral tenderness. No step-offs or obvious deformities.  NEURO: Alert & oriented x 3, Grossly unremarkable. Sensory and motor intact throughout. No focal deficits. Gait: Fluid. Normal speech and coordination.   PSYCH: Cooperative, appropriate.

## 2022-04-05 NOTE — PATIENT PROFILE ADULT - NSPROHMSYMPCOND_GEN_A_NUR
The bleeding of your ear is from a wound crated likely by your ear cleaning yesterday inside your right ear canal. It currently has stopped bleeding. There was no evidence of infection of the wound or injury to your eardrum. Please refrain from inserting anything into the ears from now on. If your ear begins to bleed again, apply light pressure to the outside of the ear and place your head in a position to drain the blood out. Do not put anything into the ear to try and stop the bleeding, as this will likely make it worse. If it does not stop bleeding on it's own after 20 min of waiting, please return to the Emergency Department for evaluation.    Please follow up with the [facility] in 1 week at the number listed in this paperwork, they are experts and can clean your ears when you build up too mach wax from now on, additionally they can reassess the ear canal wound to ensure that no infection develops. It is okay to continue your current anticoagulation regimen with Eliquis and Lovenox/Enoxaparin as this is important to prevent future clots from your malignancy. However if you continue to have episodes of bleeding your Primary Physician will need to consider reducing this regimen.    Please return to the Emergency Department if you develop pain in the ear at rest, rash surrounding the ear, headaches, uncontrolled bleeding from the ear, pus drainage from the ear, fevers, bleeding from the nose, mouth, rectum or in the urine. Please also feel free to return for evaluation if you develop any other new or concerning symptoms. none

## 2022-04-05 NOTE — H&P ADULT - PROBLEM SELECTOR PLAN 1
Troponin elevated.  Will trend  No current chest pain.  Cardio eval in am.   TTE in am  Currently on heparin drip

## 2022-04-05 NOTE — DISCHARGE NOTE PROVIDER - NSDCMRMEDTOKEN_GEN_ALL_CORE_FT
atorvastatin 40 mg oral tablet: 1 tab(s) orally once a day (at bedtime)  heparin 100 units/mL-D5% intravenous solution: intravenous once a day  metoprolol: 12.5 milligram(s) orally 2 times a day  NIFEdipine 60 mg oral tablet, extended release: 1 tab(s) orally once a day   aspirin 81 mg oral delayed release tablet: 1 tab(s) orally once a day  atorvastatin 40 mg oral tablet: 1 tab(s) orally once a day (at bedtime)  heparin 100 units/mL-D5% intravenous solution: intravenous once a day  metoprolol: 12.5 milligram(s) orally 2 times a day  NIFEdipine 60 mg oral tablet, extended release: 1 tab(s) orally once a day

## 2022-04-05 NOTE — DISCHARGE NOTE PROVIDER - NSDCCPCAREPLAN_GEN_ALL_CORE_FT
PRINCIPAL DISCHARGE DIAGNOSIS  Diagnosis: CAD (coronary artery disease)  Assessment and Plan of Treatment: Low salt, low fat diet.   Weight management.   Take medications as prescribed.    No smoking.  Follow up appointments with your doctor(s)  as instructed.   Continue your medications. Do not stop Aspirin or Plavix unless instructed by your cardiologist.  No heavy lifting or pushing/pulling or strenuous activity with procedure arm for 2 weeks. No driving for 2 days. No sex for 1 week.  You may shower 24 hours following procedure but no soaking of your wrist in water (such as in a pool, sink, or tub) for 1 week. Check wrist site for bleeding and/or swelling daily following procedure. Call your doctor/cardiologist immediately for bleeding or swelling or if you have increased/persistent pain or drainage at the wrist site or if you have numbness, tingling or blue or white coloring of your hand or fingers.  Follow up with your cardiologist in 1- 2 weeks. You may call [facility] at [phone] or [phone] after office hours and weekends with any questions or concerns following your procedure.

## 2022-04-05 NOTE — DISCHARGE NOTE PROVIDER - CARE PROVIDERS DIRECT ADDRESSES
,[email or web address] [email or web address] Medical [email or web address]

## 2022-04-05 NOTE — CONSULT NOTE ADULT - SUBJECTIVE AND OBJECTIVE BOX
Cardiology Initial Consult    [facility] - Cardiology at [place]  [address] [place] [address], [place] [address]  Office: [phone]  Fax: [phone]    CHIEF COMPLAINT:      HISTORY OF PRESENT ILLNESS:  Primary cardiologist:  Hermes    Allergies    No Known Allergies    Intolerances    	    MEDICATIONS:  heparin   Injectable 3800 Unit(s) IV Push every 6 hours PRN  heparin  Infusion.  Unit(s)/Hr IV Continuous <Continuous>  NIFEdipine XL 60 milliGRAM(s) Oral daily                  PAST MEDICAL & SURGICAL HISTORY:  No pertinent past medical history    No significant past surgical history        FAMILY HISTORY:  No pertinent family history in first degree relatives        SOCIAL HISTORY:    [ ] Non-smoker  [ ] Smoker  [ ] Alcohol    Review of Systems:  Constitutional: [ ] Fever [ ] Chills [ ] Fatigue [ ] Weight change   HEENT: [ ] Blurred vision [ ] Eye pain [ ] Headache [ ] Runny nose [ ] Sore throat   Respiratory: [ ] Cough [ ] Wheezing [ ] Shortness of breath  Cardiovascular: [ ] Chest Pain [ ] Palpitations [ ] WYATT [ ] PND [ ] Orthopnea  Gastrointestinal: [ ] Abdominal Pain [ ] Diarrhea [ ] Constipation [ ] Hemorrhoids [ ] Nausea [ ] Vomiting  Genitourinary: [ ] Nocturia [ ] Dysuria [ ] Incontinence  Extremities: [ ] Swelling [ ] Joint Pain  Neurologic: [ ] Focal deficit [ ] Paresthesias [ ] Syncope  Skin: [ ] Rash [ ] Ecchymoses [ ] Wounds [ ] Lesions  Psychiatry: [ ] Depression [ ] Suicidal/Homicidal ideation [ ] Anxiety [ ] Sleep disturbances  [ ] 10 point review of systems is otherwise negative except as mentioned above            [ ]Unable to obtain    PHYSICAL EXAM:  T(C): 36.6 (04-05-22 @ 04:53), Max: 36.8 (04-05-22 @ 02:35)  HR: 60 (04-05-22 @ 04:53) (59 - 68)  BP: 148/72 (04-05-22 @ 04:53) (142/70 - 195/110)  RR: 16 (04-05-22 @ 04:53) (15 - 18)  SpO2: 97% (04-05-22 @ 04:53) (97% - 100%)  Wt(kg): --  I&O's Summary      Appearance: No acute distress  HEENT:   Normal oral mucosa, PERRL  Cardiovascular: Normal S1 S2, no elevated JVP, no murmurs, no edema  Respiratory: Lungs clear to auscultation	, good air movement  Psychiatry: A & O x 3, Mood & affect appropriate  Gastrointestinal:  soft nt nd normoactive bs	  Skin: No rashes, no ecchymoses, no cyanosis	  Neurologic: grossly non-focal  Extremities: Normal range of motion, no clubbing, cyanosis or edema  Vascular: Peripheral pulses palpable bilaterally    LABS:	 	  CBC Full  -  ( 05 Apr 2022 01:08 )  WBC Count : 8.33 K/uL  Hemoglobin : 12.4 g/dL  Hematocrit : 38.0 %  Platelet Count - Automated : 340 K/uL  Mean Cell Volume : 89.8 fl  Mean Cell Hemoglobin : 29.3 pg  Mean Cell Hemoglobin Concentration : 32.6 g/dL  Auto Neutrophil # : 5.17 K/uL  Auto Lymphocyte # : 2.37 K/uL  Auto Monocyte # : 0.63 K/uL  Auto Eosinophil # : 0.10 K/uL  Auto Basophil # : 0.05 K/uL  Auto Neutrophil % : 62.0 %  Auto Lymphocyte % : 28.5 %  Auto Monocyte % : 7.6 %  Auto Eosinophil % : 1.2 %  Auto Basophil % : 0.6 %    04-05    140  |  109<H>  |  16  ----------------------------<  105<H>  4.0   |  25  |  0.94    Ca    9.3      05 Apr 2022 01:08    TPro  8.1  /  Alb  4.2  /  TBili  0.2  /  DBili  x   /  AST  28  /  ALT  32  /  AlkPhos  122<H>  04-05      proBNP: Serum Pro-Brain Natriuretic Peptide: 217 pg/mL (04-05 @ 01:08)    Lipid Profile:   HgA1c:   TSH:     CARDIAC MARKERS:            Troponin I, High Sensitivity Result: 279.0 ng/L (04-05-22 @ 04:26)  Troponin I, High Sensitivity Result: 153.7 ng/L (04-05-22 @ 01:08)      TELEMETRY: 	    ECG:  	  RADIOLOGY:  OTHER: 	    PREVIOUS DIAGNOSTIC TESTING:    [ ] Echocardiogram:   [ ] Catheterization:  [ ] Stress Test:  	 Cardiology Initial Consult    [facility] - Cardiology at [place]  [address] [place] [address], [place] [address]  Office: [phone]  Fax: [phone]    CHIEF COMPLAINT:  chest pain    HISTORY OF PRESENT ILLNESS:  Primary cardiologist: none  66F borderline BP who experienced an episode of mod-severe chest pressure/pain, substernal while lying down trying to sleep. No similar episodes, no associated symptoms. No palpitations, orthopnea, dizziness, or LOC.    Noted to have significantly elevated BP in the ED but otherwise does not have any significant hx.    s/p asa 325mg asa and hep gtt    No significant fam hx.  No extramural substances.  One child, was placed on bedrest during the second trimester due to extreme fatigue.    Allergies  No Known Allergies    Intolerances      MEDICATIONS:  heparin   Injectable 3800 Unit(s) IV Push every 6 hours PRN  heparin  Infusion.  Unit(s)/Hr IV Continuous <Continuous>  NIFEdipine XL 60 milliGRAM(s) Oral daily      PAST MEDICAL & SURGICAL HISTORY:  No pertinent past medical history  No significant past surgical history    FAMILY HISTORY:  No pertinent family history in first degree relatives    SOCIAL HISTORY:    [x] Non-smoker  [ ] Smoker  [ ] Alcohol    Review of Systems:  Constitutional: [- ] Fever [- ] Chills [ ] Fatigue [ ] Weight change   HEENT: [ ] Blurred vision [ ] Eye pain [- ] Headache [ ] Runny nose [ ] Sore throat   Respiratory: [- ] Cough [ ] Wheezing [- ] Shortness of breath  Cardiovascular: [x ] Chest Pain [- ] Palpitations [- ] WYATT [ ] PND [ ] Orthopnea  Gastrointestinal: [ ] Abdominal Pain [ ] Diarrhea [ ] Constipation [ ] Hemorrhoids [- ] Nausea [- ] Vomiting  Genitourinary: [ ] Nocturia [- ] Dysuria [ ] Incontinence  Extremities: [- ] Swelling [ ] Joint Pain  Neurologic: [ ] Focal deficit [ ] Paresthesias [ -] Syncope  Skin: [ -] Rash [ ] Ecchymoses [ ] Wounds [ ] Lesions  Psychiatry: [- ] Depression [ ] Suicidal/Homicidal ideation [ ] Anxiety [ ] Sleep disturbances  [x ] 10 point review of systems is otherwise negative except as mentioned above            [ ]Unable to obtain    PHYSICAL EXAM:  T(C): 36.6 (04-05-22 @ 04:53), Max: 36.8 (04-05-22 @ 02:35)  HR: 60 (04-05-22 @ 04:53) (59 - 68)  BP: 148/72 (04-05-22 @ 04:53) (142/70 - 195/110)  RR: 16 (04-05-22 @ 04:53) (15 - 18)  SpO2: 97% (04-05-22 @ 04:53) (97% - 100%)  Wt(kg): --  I&O's Summary    Appearance: No acute distress  HEENT:   mmm  Cardiovascular: Normal S1 S2, no elevated JVP, no murmurs, no edema  Respiratory: Lungs clear to auscultation	, good air movement  Psychiatry: A & O x 3, Mood & affect appropriate  Gastrointestinal:  soft nt  Skin: No rashes, no ecchymoses, no cyanosis	  Neurologic: grossly non-focal  Extremities: Normal range of motion, no clubbing, cyanosis or edema  Vascular: Peripheral pulses palpable bilaterally    LABS:	 	  CBC Full  -  ( 05 Apr 2022 01:08 )  WBC Count : 8.33 K/uL  Hemoglobin : 12.4 g/dL  Hematocrit : 38.0 %  Platelet Count - Automated : 340 K/uL    04-05  140  |  109<H>  |  16  ----------------------------<  105<H>  4.0   |  25  |  0.94    Ca    9.3      05 Apr 2022 01:08    TPro  8.1  /  Alb  4.2  /  TBili  0.2  /  DBili  x   /  AST  28  /  ALT  32  /  AlkPhos  122<H>  04-05  proBNP: Serum Pro-Brain Natriuretic Peptide: 217 pg/mL (04-05 @ 01:08)    Troponin I, High Sensitivity Result: 279.0 ng/L (04-05-22 @ 04:26)  Troponin I, High Sensitivity Result: 153.7 ng/L (04-05-22 @ 01:08)    TELEMETRY: 	  SR  ECG:  	SR, TWI V2-V4  RADIOLOGY:  OTHER: 	    PREVIOUS DIAGNOSTIC TESTING:    [ ] Echocardiogram:   [ ] Catheterization:  [ ] Stress Test:

## 2022-04-05 NOTE — CHART NOTE - NSCHARTNOTEFT_GEN_A_CORE
pt admitted early am and is being transferred to [facility] per cardiology reccs for cardiac cath ,  admitted with NTSEMI, please see discharge for more details.  Vital Signs Last 24 Hrs  T(C): 36.7 (05 Apr 2022 10:48), Max: 36.8 (05 Apr 2022 02:35)  T(F): 98 (05 Apr 2022 10:48), Max: 98.2 (05 Apr 2022 02:35)  HR: 72 (05 Apr 2022 10:48) (59 - 73)  BP: 123/74 (05 Apr 2022 10:48) (117/70 - 195/110)  BP(mean): --  RR: 18 (05 Apr 2022 10:48) (15 - 18)  SpO2: 98% (05 Apr 2022 10:48) (97% - 100%)      Appearance: No acute distress  HEENT:   mmm  Cardiovascular: Normal S1 S2, no elevated JVP, no murmurs, no edema  Respiratory: Lungs clear to auscultation	, good air movement  Psychiatry: A & O x 3, Mood & affect appropriate  Gastrointestinal:  soft nt  Skin: No rashes, no ecchymoses, no cyanosis	  Neurologic: grossly non-focal  Extremities: Normal range of motion, no clubbing, cyanosis or edema  Vascular: Peripheral pulses palpable bilaterally    a/p as above xfer to [facility] for cath in setting of NSTEMI per cardiology recommendations

## 2022-04-06 ENCOUNTER — TRANSCRIPTION ENCOUNTER (OUTPATIENT)
Age: 67
End: 2022-04-06

## 2022-04-06 VITALS
SYSTOLIC BLOOD PRESSURE: 109 MMHG | RESPIRATION RATE: 17 BRPM | OXYGEN SATURATION: 100 % | TEMPERATURE: 98 F | DIASTOLIC BLOOD PRESSURE: 85 MMHG | HEART RATE: 63 BPM

## 2022-04-06 PROCEDURE — C1894: CPT

## 2022-04-06 PROCEDURE — 99152 MOD SED SAME PHYS/QHP 5/>YRS: CPT

## 2022-04-06 PROCEDURE — C1769: CPT

## 2022-04-06 PROCEDURE — C9600: CPT | Mod: LD

## 2022-04-06 PROCEDURE — C1887: CPT

## 2022-04-06 PROCEDURE — C1725: CPT

## 2022-04-06 PROCEDURE — 99153 MOD SED SAME PHYS/QHP EA: CPT

## 2022-04-06 PROCEDURE — 93005 ELECTROCARDIOGRAM TRACING: CPT

## 2022-04-06 PROCEDURE — C1874: CPT

## 2022-04-06 PROCEDURE — 93454 CORONARY ARTERY ANGIO S&I: CPT | Mod: 59

## 2022-04-06 RX ORDER — TICAGRELOR 90 MG/1
1 TABLET ORAL
Qty: 180 | Refills: 3
Start: 2022-04-06 | End: 2023-03-31

## 2022-04-06 RX ORDER — METOPROLOL TARTRATE 50 MG
0.5 TABLET ORAL
Qty: 30 | Refills: 0
Start: 2022-04-06 | End: 2022-05-05

## 2022-04-06 RX ADMIN — Medication 60 MILLIGRAM(S): at 05:22

## 2022-04-06 RX ADMIN — Medication 81 MILLIGRAM(S): at 05:22

## 2022-04-06 RX ADMIN — TICAGRELOR 90 MILLIGRAM(S): 90 TABLET ORAL at 05:22

## 2022-04-06 NOTE — DISCHARGE NOTE NURSING/CASE MANAGEMENT/SOCIAL WORK - NSDCPEFALRISK_GEN_ALL_CORE
For information on Fall & Injury Prevention, visit: [email or web address] OR  [email or web address] OR  https://www.cdc.gov/steadi/patient.html

## 2022-04-06 NOTE — DISCHARGE NOTE NURSING/CASE MANAGEMENT/SOCIAL WORK - PATIENT PORTAL LINK FT
You can access the FollowMyHealth Patient Portal offered by [facility] by registering at the following website: [email or web address]. By joining [employer]’s FollowMyHealth portal, you will also be able to view your health information using other applications (apps) compatible with our system.

## 2022-04-06 NOTE — PROGRESS NOTE ADULT - SUBJECTIVE AND OBJECTIVE BOX
Cardiology Progress Note    Interval Events: Patient seen and assessed overnight. NAD, denies chest pain, palpitations, shortness of breath or any other complaints.       MEDICATIONS:  aspirin enteric coated 81 milliGRAM(s) Oral daily  metoprolol tartrate 12.5 milliGRAM(s) Oral two times a day  NIFEdipine XL 60 milliGRAM(s) Oral daily  ticagrelor 90 milliGRAM(s) Oral two times a day    atorvastatin 40 milliGRAM(s) Oral at bedtime    sodium chloride 0.9%. 1000 milliLiter(s) IV Continuous <Continuous>      PHYSICAL EXAM:  T(C): 36.7 (04-05-22 @ 11:50), Max: 36.8 (04-05-22 @ 02:35)  HR: 74 (04-05-22 @ 21:45) (48 - 74)  BP: 126/78 (04-05-22 @ 21:45) (108/73 - 175/90)  RR: 17 (04-05-22 @ 21:45) (14 - 18)  SpO2: 98% (04-05-22 @ 20:45) (96% - 100%)  I&O's Summary    Daily Height in cm: 165.1 (05 Apr 2022 11:50)    Daily     Appearance: Normal	  HEENT:  NC/AT. Normal oral mucosa, PERRL, EOMI	  Cardiovascular: RRR, Normal S1 S2, No murmurs, rubs or gallops. No JVD. No lower extremity edema.  Procedural Access Site: Right radial no hematoma, Non-tender to palpation, 2+ pulse, No ecchymosis  Respiratory: Clear to ausculation bilaterally  Gastrointestinal:  Soft, non-tender, no rebound, no guarding, normal bowel sounds.  Musculoskeletal: Normal range of motion, no clubbing, no joint deformity, no cyanosis.  Neurologic: Non-focal  Neurologic: grossly nonfocal  Lymphatic: No lymphadenopathy  Vascular: Peripheral pulses palpable bilaterally  Psychiatry: A & O x 3, Mood & affect appropriate  Skin: No rashes, No ecchymoses, No cyanosis	    LABS:	 	             12.9  6.83 )-----------( 326    ( 04-05-22 @ 10:19 )             38.9               12.4  8.33 )-----------( 340    ( 04-05-22 @ 01:08 )             38.0      04-05    140  |  109<H>  |  16  ----------------------------<  105<H>  4.0   |  25  |  0.94    Ca    9.3      05 Apr 2022 01:08    TPro  8.1  /  Alb  4.2  /  TBili  0.2  /  DBili  x   /  AST  28  /  ALT  32  /  AlkPhos  122<H>  04-05      proBNP: Serum Pro-Brain Natriuretic Peptide: 217 pg/mL (04-05 @ 01:08)    Lipid Profile:   HgA1c:   TSH:     CARDIAC MARKERS:      TELEMETRY: 	    ECG:  	  RADIOLOGY:  OTHER: 	    PREVIOUS DIAGNOSTIC TESTING:    [ ] Echocardiogram:  [ ] Catheterization:  [ ] Stress Test:  	    67 yo AA F with no PMH who presents to [facility] ED today for c/c of  chest pain.  Patient states that she was trying to go to sleep when she developed 6/10, substernal CP.  Pain was non-radiating, lasted about 10 minutes, not associated with palpitations, dyspnea, dizziness, lightheadedness, diaphoresis, or syncope.  Denies similar symptoms in the past.  Has had no recurrence of the pain.  Takes no daily medications. SHx: cholecystectomy.  Hypertensive in ED - 195/110. Found to have NSTEMI,  + Trops ( 153.7=> 279.0).Loaded with ASA 325mg and ticagrelor 180mg once; started on  metoprolol tartrate 12.5 mg BID Transferred to [facility] for LHC for further evaluation of CAD.  On arrival to [facility], pt remains asymptomatic.    # CAD  [facility] s/p ASA 325mg and Ticagrelor 180mg x1 load, started on Metoprolol  - 4/5 s/p LHC ALYSHA x2 to LAD via RRA, site without hematoma, positive pulses. Patient without complaint overnight, hemodynamically stable.   - Continue Aspirin and Brilinta  - Continue Atorvastatin  - Continue DASH/TLC diet  - Telemetry monitoring    # HTN  - Continue Metoprolol and Nifedipine   - Continue DASH/TLC diet    # Disposition  - Anticipate discharge in AM if site and condition stable Cardiology Progress Note    Interval Events: Patient seen and assessed overnight. NAD, denies chest pain, palpitations, shortness of breath or any other complaints.       MEDICATIONS:  aspirin enteric coated 81 milliGRAM(s) Oral daily  metoprolol tartrate 12.5 milliGRAM(s) Oral two times a day  NIFEdipine XL 60 milliGRAM(s) Oral daily  ticagrelor 90 milliGRAM(s) Oral two times a day    atorvastatin 40 milliGRAM(s) Oral at bedtime    sodium chloride 0.9%. 1000 milliLiter(s) IV Continuous <Continuous>      PHYSICAL EXAM:  T(C): 36.7 (04-05-22 @ 11:50), Max: 36.8 (04-05-22 @ 02:35)  HR: 74 (04-05-22 @ 21:45) (48 - 74)  BP: 126/78 (04-05-22 @ 21:45) (108/73 - 175/90)  RR: 17 (04-05-22 @ 21:45) (14 - 18)  SpO2: 98% (04-05-22 @ 20:45) (96% - 100%)  I&O's Summary    Daily Height in cm: 165.1 (05 Apr 2022 11:50)    Daily     Appearance: Normal	  HEENT:  NC/AT. Normal oral mucosa, PERRL, EOMI	  Cardiovascular: RRR, Normal S1 S2, No murmurs, rubs or gallops. No JVD. No lower extremity edema.  Procedural Access Site: Right radial no hematoma, Non-tender to palpation, 2+ pulse, No ecchymosis  Respiratory: Clear to ausculation bilaterally  Gastrointestinal:  Soft, non-tender, no rebound, no guarding, normal bowel sounds.  Musculoskeletal: Normal range of motion, no clubbing, no joint deformity, no cyanosis.  Neurologic: Non-focal  Neurologic: grossly nonfocal  Lymphatic: No lymphadenopathy  Vascular: Peripheral pulses palpable bilaterally  Psychiatry: A & O x 3, Mood & affect appropriate  Skin: No rashes, No ecchymoses, No cyanosis	    LABS:	 	             12.9  6.83 )-----------( 326    ( 04-05-22 @ 10:19 )             38.9               12.4  8.33 )-----------( 340    ( 04-05-22 @ 01:08 )             38.0      04-05    140  |  109<H>  |  16  ----------------------------<  105<H>  4.0   |  25  |  0.94    Ca    9.3      05 Apr 2022 01:08    TPro  8.1  /  Alb  4.2  /  TBili  0.2  /  DBili  x   /  AST  28  /  ALT  32  /  AlkPhos  122<H>  04-05      proBNP: Serum Pro-Brain Natriuretic Peptide: 217 pg/mL (04-05 @ 01:08)    Lipid Profile:   HgA1c:   TSH:     CARDIAC MARKERS:      TELEMETRY: 	    ECG:  	  RADIOLOGY:  OTHER: 	    PREVIOUS DIAGNOSTIC TESTING:    [ ] Echocardiogram:  [ ] Catheterization:  [ ] Stress Test:  	    67 yo AA F with no PMH who presents to [facility] ED today for c/c of  chest pain.  Patient states that she was trying to go to sleep when she developed 6/10, substernal CP.  Pain was non-radiating, lasted about 10 minutes, not associated with palpitations, dyspnea, dizziness, lightheadedness, diaphoresis, or syncope.  Denies similar symptoms in the past.  Has had no recurrence of the pain.  Takes no daily medications. SHx: cholecystectomy.  Hypertensive in ED - 195/110. Found to have NSTEMI,  + Trops ( 153.7=> 279.0).Loaded with ASA 325mg and ticagrelor 180mg once; started on  metoprolol tartrate 12.5 mg BID Transferred to [facility] for LHC for further evaluation of CAD.  On arrival [facility], pt remains asymptomatic.    # CAD  [facility] s/p ASA 325mg and Ticagrelor 180mg x1 load, started on Metoprolol  - 4/5 s/p LHC ALYSHA x2 to LAD via RRA, site without hematoma, positive pulses. Patient without complaint overnight, hemodynamically stable.   - Continue Aspirin and Brilinta  - Continue Atorvastatin  - Continue DASH/TLC diet  - Telemetry monitoring    # HTN  - Continue Metoprolol and Nifedipine   - Continue DASH/TLC diet    # Disposition  - Anticipate discharge in AM if site and condition stable    Reina Mcdonald PA-C  [phone] Cardiology Progress Note    Interval Events: Patient seen and assessed overnight. NAD, denies chest pain, palpitations, shortness of breath or any other complaints.       MEDICATIONS:  aspirin enteric coated 81 milliGRAM(s) Oral daily  metoprolol tartrate 12.5 milliGRAM(s) Oral two times a day  NIFEdipine XL 60 milliGRAM(s) Oral daily  ticagrelor 90 milliGRAM(s) Oral two times a day    atorvastatin 40 milliGRAM(s) Oral at bedtime    sodium chloride 0.9%. 1000 milliLiter(s) IV Continuous <Continuous>      PHYSICAL EXAM:  T(C): 36.7 (04-05-22 @ 11:50), Max: 36.8 (04-05-22 @ 02:35)  HR: 74 (04-05-22 @ 21:45) (48 - 74)  BP: 126/78 (04-05-22 @ 21:45) (108/73 - 175/90)  RR: 17 (04-05-22 @ 21:45) (14 - 18)  SpO2: 98% (04-05-22 @ 20:45) (96% - 100%)  I&O's Summary    Daily Height in cm: 165.1 (05 Apr 2022 11:50)    Daily     Appearance: Normal	  HEENT:  NC/AT. Normal oral mucosa, PERRL, EOMI	  Cardiovascular: RRR, Normal S1 S2, No murmurs, rubs or gallops. No JVD. No lower extremity edema.  Procedural Access Site: Right radial no hematoma, Non-tender to palpation, 2+ pulse, No ecchymosis  Respiratory: Clear to ausculation bilaterally  Gastrointestinal:  Soft, non-tender, no rebound, no guarding, normal bowel sounds.  Musculoskeletal: Normal range of motion, no clubbing, no joint deformity, no cyanosis.  Neurologic: Non-focal  Neurologic: grossly nonfocal  Lymphatic: No lymphadenopathy  Vascular: Peripheral pulses palpable bilaterally  Psychiatry: A & O x 3, Mood & affect appropriate  Skin: No rashes, No ecchymoses, No cyanosis	    LABS:	 	             12.9  6.83 )-----------( 326    ( 04-05-22 @ 10:19 )             38.9               12.4  8.33 )-----------( 340    ( 04-05-22 @ 01:08 )             38.0      04-05    140  |  109<H>  |  16  ----------------------------<  105<H>  4.0   |  25  |  0.94    Ca    9.3      05 Apr 2022 01:08    TPro  8.1  /  Alb  4.2  /  TBili  0.2  /  DBili  x   /  AST  28  /  ALT  32  /  AlkPhos  122<H>  04-05      proBNP: Serum Pro-Brain Natriuretic Peptide: 217 pg/mL (04-05 @ 01:08)    Lipid Profile:   HgA1c:   TSH:     CARDIAC MARKERS:      TELEMETRY: 	    ECG:  	  RADIOLOGY:  OTHER: 	  TTE Echo Complete w/o Contrast w/ Doppler (04.05.22 @ 10:01)  PHYSICIAN INTERPRETATION:  Left Ventricle: The left ventricular internal cavity size is normal.   Increased relative wall thickness with normal mass index consistent with   left ventricular concentric remodeling.  Global LV systolic function was normal. Left ventricular ejection   fraction, by visual estimation, is 55 to 60%. Normal segmental left   ventricular systolic function. E/A reversal consistent with reduced LV   compliance.  Right Ventricle: Normal right ventricular size and function.  Left Atrium: The left atrium is normal in size.  Right Atrium: The right atrium is normal in size.  Pericardium: There is no evidence of pericardial effusion.  Mitral Valve: Thickening of the anterior mitral valve leaflet. Trace   mitral valve regurgitation is seen.  Tricuspid Valve: The tricuspid valve is not well seen. Mild tricuspid   regurgitation is visualized.  Aortic Valve: The aortic valve was not well visualized. Peak transaortic   gradient equals 8.9 mmHg, mean transaortic gradient equals 4.7 mmHg, the   calculated aortic valve area equals 3.11 cm² by the continuity equation   consistent with normally opening aortic valve. The aortic valvemean   gradient is 4.7 mmHg consistent with normally opening aortic valve.   Trivial aortic valve regurgitation is seen.  Pulmonic Valve: The pulmonic valve was not well visualized. Mild pulmonic   valve regurgitation.  Aorta: Aortic root measured atSinus of Valsalva is normal.  Pulmonary Artery: The pulmonary artery is not well seen.      Summary:   1. Left ventricular ejection fraction, by visual estimation, is 55 to   60%.   2. Normal global left ventricular systolic function.   3. E/A reversal consistent with reduced LV compliance.   4. Increased relative wall thickness with normal mass index consistent   with left ventricular concentric remodeling.   5. The aortic valve mean gradient is 4.7 mmHg consistent with normally   opening aorticvalve.    PREVIOUS DIAGNOSTIC TESTING:    [ ] Echocardiogram:  [ ] Catheterization:  [ ] Stress Test:  	    67 yo AA F with no PMH who presents to [facility] ED today for c/c of  chest pain.  Patient states that she was trying to go to sleep when she developed 6/10, substernal CP.  Pain was non-radiating, lasted about 10 minutes, not associated with palpitations, dyspnea, dizziness, lightheadedness, diaphoresis, or syncope.  Denies similar symptoms in the past.  Has had no recurrence of the pain.  Takes no daily medications. SHx: cholecystectomy.  Hypertensive in ED - 195/110. Found to have NSTEMI,  + Trops ( 153.7=> 279.0).Loaded with ASA 325mg and ticagrelor 180mg once; started on  metoprolol tartrate 12.5 mg BID Transferred to [facility] for LHC for further evaluation of CAD.  On arrival to [facility], pt remains asymptomatic.    # CAD  [facility] s/p ASA 325mg and Ticagrelor 180mg x1 load, started on Metoprolol  - 4/5 s/p LHC ALYSHA x2 to LAD via RRA, site without hematoma, positive pulses. Patient without complaint overnight, hemodynamically stable.   - Continue Aspirin and Brilinta  - Continue Atorvastatin  - Continue DASH/TLC diet  - Telemetry monitoring    # HTN  - Continue Metoprolol and Nifedipine   - Continue DASH/TLC diet    # Disposition  - Anticipate discharge in AM if site and condition stable    ROSE Taylor-FIDEL  [phone]

## 2022-04-08 DIAGNOSIS — I10 ESSENTIAL (PRIMARY) HYPERTENSION: ICD-10-CM

## 2022-04-08 DIAGNOSIS — I21.4 NON-ST ELEVATION (NSTEMI) MYOCARDIAL INFARCTION: ICD-10-CM

## 2022-04-08 DIAGNOSIS — R07.9 CHEST PAIN, UNSPECIFIED: ICD-10-CM

## 2022-04-15 ENCOUNTER — EMERGENCY (EMERGENCY)
Facility: HOSPITAL | Age: 67
LOS: 1 days | Discharge: HOME | End: 2022-04-15
Attending: STUDENT IN AN ORGANIZED HEALTH CARE EDUCATION/TRAINING PROGRAM
Payer: COMMERCIAL

## 2022-04-15 VITALS
DIASTOLIC BLOOD PRESSURE: 69 MMHG | RESPIRATION RATE: 17 BRPM | TEMPERATURE: 98 F | HEIGHT: 65 IN | SYSTOLIC BLOOD PRESSURE: 103 MMHG | OXYGEN SATURATION: 99 % | WEIGHT: 134.92 LBS | HEART RATE: 95 BPM

## 2022-04-15 VITALS
TEMPERATURE: 98 F | OXYGEN SATURATION: 100 % | HEART RATE: 75 BPM | SYSTOLIC BLOOD PRESSURE: 110 MMHG | RESPIRATION RATE: 18 BRPM | DIASTOLIC BLOOD PRESSURE: 71 MMHG

## 2022-04-15 DIAGNOSIS — Z90.49 ACQUIRED ABSENCE OF OTHER SPECIFIED PARTS OF DIGESTIVE TRACT: Chronic | ICD-10-CM

## 2022-04-15 PROBLEM — I21.4 NON-ST ELEVATION (NSTEMI) MYOCARDIAL INFARCTION: Chronic | Status: ACTIVE | Noted: 2022-04-05

## 2022-04-15 PROCEDURE — 93010 ELECTROCARDIOGRAM REPORT: CPT

## 2022-04-15 PROCEDURE — 93005 ELECTROCARDIOGRAM TRACING: CPT

## 2022-04-15 PROCEDURE — 99284 EMERGENCY DEPT VISIT MOD MDM: CPT

## 2022-04-15 PROCEDURE — 96374 THER/PROPH/DIAG INJ IV PUSH: CPT

## 2022-04-15 PROCEDURE — 99284 EMERGENCY DEPT VISIT MOD MDM: CPT | Mod: 25

## 2022-04-15 PROCEDURE — 96375 TX/PRO/DX INJ NEW DRUG ADDON: CPT

## 2022-04-15 RX ORDER — FAMOTIDINE 10 MG/ML
40 INJECTION INTRAVENOUS ONCE
Refills: 0 | Status: DISCONTINUED | OUTPATIENT
Start: 2022-04-15 | End: 2022-04-15

## 2022-04-15 RX ORDER — FAMOTIDINE 10 MG/ML
20 INJECTION INTRAVENOUS ONCE
Refills: 0 | Status: COMPLETED | OUTPATIENT
Start: 2022-04-15 | End: 2022-04-15

## 2022-04-15 RX ORDER — DIPHENHYDRAMINE HCL 50 MG
50 CAPSULE ORAL ONCE
Refills: 0 | Status: COMPLETED | OUTPATIENT
Start: 2022-04-15 | End: 2022-04-15

## 2022-04-15 RX ADMIN — Medication 50 MILLIGRAM(S): at 10:40

## 2022-04-15 RX ADMIN — FAMOTIDINE 20 MILLIGRAM(S): 10 INJECTION INTRAVENOUS at 10:42

## 2022-04-15 NOTE — ED PROVIDER NOTE - EKG ADDITIONAL INFORMATION FREE TEXT
T wave inversions, inverted to positve in I, aVL, consistent with s/p CAD, no signinficant morphological change

## 2022-04-15 NOTE — ED PROVIDER NOTE - NS ED ROS FT
CONSTITUTIONAL: No fevers, no chills, no lightheadedness, no dizziness  EYES: no visual changes, no eye pain  EARS: no ear drainage, no ear pain, no change in hearing  NOSE: no nasal congestion  MOUTH/THROAT: no sore throat  CV: No chest pain, no palpitations  RESP: No SOB, no cough  GI: No n/v/d, no abd pain  : no dysuria, no hematuria, no flank pain  MSK: no back pain, no extremity pain  SKIN: see hpi  NEURO: no headache, no focal weakness, no decreased sensation/parasthesias   PSYCHIATRIC: no known mental health issues

## 2022-04-15 NOTE — ED ADULT NURSE NOTE - NSIMPLEMENTINTERV_GEN_ALL_ED
Implemented All Universal Safety Interventions:  [place] to call system. Call bell, personal items and telephone within reach. Instruct patient to call for assistance. Room bathroom lighting operational. Non-slip footwear when patient is off stretcher. Physically safe environment: no spills, clutter or unnecessary equipment. Stretcher in lowest position, wheels locked, appropriate side rails in place.

## 2022-04-15 NOTE — ED PROVIDER NOTE - OBJECTIVE STATEMENT
Patient is a 68 y/o F w/ PMHx of NSTEMI s/p stent placement x1 1 wk ago and PSHx of cholecystectomy  who presents to the ED with hives. Wednesday morning woke up with UE and LE hives, pruritic, erythematous and warm that have since spread to include trunk, back and cheeks. No SOB, stridor, drooling, change in voice, cp, palpitations, f/c, n/v, abd pain, throat pain, diarrhea or urinary symptoms.  Has used OTC anti-histamine x3 days w/o relief. Tuesday night ate fruit cup and chicken sandwich. Was started on Brilinta, Nifedipine, ASA, and metoprolol last week. NKDA or food allergies. Never had hives in past. No new detergents, lotions or clothing. No travel.

## 2022-04-15 NOTE — ED PROVIDER NOTE - PHYSICAL EXAMINATION
General: Alert and Orientated x 3. No apparent distress. No stridor or drooling or muffles voice.   Head: Normocephalic and atraumatic. Erythematous and warm cheeks.  Eyes: PERRLA with EOMI.  Neck: Supple. Trachea midline.   Oral cavity: no elisions   Cardiac: Normal S1 and S2 w/ RRR. No murmurs appreciated.  Pulmonary: CTA bilaterally. No increased WOB. No wheezes or crackles.  Abdominal: Soft, non-tender. (+) bowel sounds appreciated in all 4 quadrants. No hepatosplenomegaly.   Neurologic: No focal sensory or motor deficits.  Musculoskeletal: Strength appropriate in all 4 extremities for age with no limited ROM.  Skin: Hives entire UE, proximal LE, trunk, back and b/l cheeks. Not well-demarcated, slightly raised, erythematous and warm to touch   Psychiatric: Appropriate mood and affect. No apparent risk to self or others.

## 2022-04-15 NOTE — ED PROVIDER NOTE - CLINICAL SUMMARY MEDICAL DECISION MAKING FREE TEXT BOX
68 y/o F p/w hives x 2 days w/o improvement from PO anti-histamine. Unclear etiology- food or new meds. Vitals stable. Exam as above. Low concern for anaphylaxis.  Will give IV meds in ED. Will provide allergy referral. Dispo - home pending reassess

## 2022-04-15 NOTE — ED PROVIDER NOTE - NSFOLLOWUPINSTRUCTIONS_ED_ALL_ED_FT
please discuss to adjust the medication with you doctor based on the symptoms that you have today    Rash    A rash is a change in the color of the skin. A rash can also change the way your skin feels. There are many different conditions and factors that can cause a rash, most of which are not dangerous. Make sure to follow up with your primary care physician or a dermatologist as instructed by your health care provider.    SEEK IMMEDIATE MEDICAL CARE IF YOU HAVE ANY OF THE FOLLOWING SYMPTOMS: fever, blisters, a rash inside your mouth, vaginal or anal pain, or altered mental status.

## 2022-04-15 NOTE — ED ADULT NURSE NOTE - NSFALLRSKPASTHIST_ED_ALL_ED
Face to Face Supporting Documentation - Home Health    The encounter with this patient was in whole or in part the primary reason for home health admission.    Date of encounter:   Patient:                    MRN:                       YOB: 2017  Atiya Henley  [id number]  1951     Home health to see patient for:  Skilled Nursing care for assessment, interventions & education and Physical Therapy evaluation and treatment    Skilled need for:  Exacerbation of Chronic Disease State COPD and New Onset Medical Diagnosis Multiple recent falls. Bridging Warfarin & Lovenox    Skilled nursing interventions to include:  Comment: Medication setup and administration    Homebound status evidenced by:  Need the aid of supportive devices such as crutches, canes, wheelchairs or walkers, Require the use of special transportation or Needs the assistance of another person in order to leave the home. Leaving home requires a considerable and taxing effort. There is a normal inability to leave the home.    Community Physician to provide follow up care: Carmelo Pat M.D.     Optional Interventions? No      I certify the face to face encounter for this home health care referral meets the CMS requirements and the encounter/clinical assessment with the patient was, in whole, or in part, for the medical condition(s) listed above, which is the primary reason for home health care. Based on my clinical findings: the service(s) are medically necessary, support the need for home health care, and the homebound criteria are met.  I certify that this patient has had a face to face encounter by myself.  TAMAR Rangel - NPI: [id number]    
no

## 2022-04-15 NOTE — ED PROVIDER NOTE - ATTENDING CONTRIBUTION TO CARE
I have personally seen and examined this patient.  I have fully participated in the care of this patient. I performed a substantive portion of the visit including all aspects of the medical decision making. I have reviewed all pertinent clinical information, including history, physical exam, plan and the Resident’s note and agree except as noted. - MD Reg.    66 yo F< with h/o CAD, s/p stents, 2 wks, started on ASA, Brilanta, Amlodipine, metopro, face, trank, hives, since yesterday, itchy but no mucosal involvement, given her meds, low risk for developing TEN or SJS, not anaphylaxic, all meds are necessarily, symptomatic relief including pred, out pt follow up.

## 2022-04-15 NOTE — ED PROVIDER NOTE - PATIENT PORTAL LINK FT
You can access the FollowMyHealth Patient Portal offered by [facility] by registering at the following website: [email or web address]. By joining [facility]’s FollowMyHealth portal, you will also be able to view your health information using other applications (apps) compatible with our system.

## 2022-04-15 NOTE — ED ADULT NURSE NOTE - OBJECTIVE STATEMENT
Received pt in assigned room a&ox3, pt states hives/redness/itchiness began Wednesday morning to  upper extremities, trunk & face, denies any sob, itchiness to throat, abd pain or n/v, pt denies eating new foods or using new products, pt reports she has been taking new medication for 1 week ( Brilinta, Asa, Nifedipine, and Metoprolol), speaking In full sentences, no resp distress noted, resps even and non labored, took 1 tablet of Benadryl at 2am,  MD at bedside for eval of pt, will continue to monitor pt. Received pt in assigned room a&ox3, pt states hives/redness/itchiness began Wednesday morning to  upper extremities, trunk & face, denies any sob, itchiness to throat, abd pain or n/v, pt denies eating new foods or using new products, pt reports she has been taking new medication for 1 week ( Brilinta, Asa, Nifedipine, and Metoprolol),  s/p 1 cardiac stent placement, speaking In full sentences, no resp distress noted, resps even and non labored, took 1 tablet of Benadryl at 2am,  MD at bedside for eval of pt, will continue to monitor pt.

## 2022-04-15 NOTE — ED ADULT TRIAGE NOTE - CHIEF COMPLAINT QUOTE
pt started with hives on Wednesday took Benadryl not working or resolving getting worse  no respiratory distress

## 2022-04-27 ENCOUNTER — APPOINTMENT (OUTPATIENT)
Dept: CARDIOLOGY | Facility: CLINIC | Age: 67
End: 2022-04-27
Payer: COMMERCIAL

## 2022-04-27 ENCOUNTER — NON-APPOINTMENT (OUTPATIENT)
Age: 67
End: 2022-04-27

## 2022-04-27 VITALS
HEIGHT: 64 IN | WEIGHT: 138 LBS | SYSTOLIC BLOOD PRESSURE: 136 MMHG | HEART RATE: 75 BPM | OXYGEN SATURATION: 100 % | BODY MASS INDEX: 23.56 KG/M2 | DIASTOLIC BLOOD PRESSURE: 81 MMHG | TEMPERATURE: 98 F

## 2022-04-27 PROCEDURE — 93000 ELECTROCARDIOGRAM COMPLETE: CPT

## 2022-04-27 PROCEDURE — 99214 OFFICE O/P EST MOD 30 MIN: CPT

## 2022-04-27 NOTE — CARDIOLOGY SUMMARY
[de-identified] : 4/2022:\par Left Ventricle: The left ventricular internal cavity size is normal. \par Increased relative wall thickness with normal mass index consistent with \par left ventricular concentric remodeling.\par Global LV systolic function was normal. Left ventricular ejection \par fraction, by visual estimation, is 55 to 60%. Normal segmental left \par ventricular systolic function. E/A reversal consistent with reduced LV \par compliance.\par Right Ventricle: Normal right ventricular size and function.\par Left Atrium: The left atrium is normal in size.\par Right Atrium: The right atrium is normal in size.\par Pericardium: There is no evidence of pericardial effusion.\par Mitral Valve: Thickening of the anterior mitral valve leaflet. Trace \par mitral valve regurgitation is seen.\par Tricuspid Valve: The tricuspid valve is not well seen. Mild tricuspid \par regurgitation is visualized.\par Aortic Valve: The aortic valve was not well visualized. Peak transaortic \par gradient equals 8.9 mmHg, mean transaortic gradient equals 4.7 mmHg, the \par calculated aortic valve area equals 3.11 cm² by the continuity equation \par consistent with normally opening aortic valve. The aortic valve mean \par gradient is 4.7 mmHg consistent with normally opening aortic valve. \par Trivial aortic valve regurgitation is seen.\par Pulmonic Valve: The pulmonic valve was not well visualized. Mild pulmonic \par valve regurgitation.\par Aorta: Aortic root measured at Sinus of Valsalva is normal.\par Pulmonary Artery: The pulmonary artery is not well seen.\par  [de-identified] : 4/2022:\par Diagnostic Findings: \par \par Coronary Angiography \par The coronary circulation is right dominant.  \par \par LM \par Left main artery: Angiography shows no disease.  \par \par LAD \par Proximal left anterior descending: Angiography shows minor\par irregularities. Mid left anterior descending: There is a 90 %\par stenosis. Distal left anterior descending: Angiography shows minor\par irregularities.\par \par CX \par Circumflex: Angiography shows minor irregularities.  \par \par RCA \par Right coronary artery: Angiography shows minor irregularities.  \par

## 2022-04-27 NOTE — HISTORY OF PRESENT ILLNESS
[FreeTextEntry1] : 67F CAD/PCI  presents to establish cardiovascular care\par Sent in by: [facility] discharge\par interventional cards: Dr Simona Hector\par \par on 4/5/22, pt presented to [facility] with CP,  found to have a trop 153 up to 279. EKG with anterolateral TWI. pt transferred to [facility] and underwent a cardiac cath, found to have a 90% LAD lesion, and received a 3.00 X 18 ANAIS ALYSHA, and a 2.75 X 12 ANAIS ALYSHA. pt to be continued on asa, and brilinta for one year per interventional cards recs. \par TTE showing a LV EF 55%\par \par Pt now presents to outpt cardiology.\par today,\par pt feeling well overall. denies cp and sob. denies palpitations, dizziness, syncope. LE edema, orthopnea. \par \par \par \par Exercise: none\par Diet: none\par \par Prior cardiac workup:\par Recent labs:\par  \par EKG: SR TWI v1 v2 aVL\par \par  \par Med hx: CAD/PCI\par OBGYN hx: one child, denies prev hx of preeclampsia, gest htn, gest dm, or peripartum cardiomyopathy.                  Currently post menopause. No hx of miscarriage. \par Sx hx: cholecystectomy\par Fam hx: no known cardiac hx\par Social hx: lives in [place] with son. [employer]. no tob, etho, no drugs. \par Meds: asa brilinta (doesn’t remember the rest)\par Allergies: nkda\par

## 2022-04-27 NOTE — DISCUSSION/SUMMARY
[FreeTextEntry1] : 67F CAD/PCI  presents to establish cardiovascular care\par \par 1. CAD s/p PCI\par -cont asa and brilinta\par -pt instructed to check with pharmacy to see if she is able to get/afford brilinta, coupon card signed up and given to pt at visit today\par -if she cant she will call us and plan will be to switch to plavix\par -pt unclear on other emds (ex: statin, bb, ace). pt to go home and check medications and call with complete list\par -if she is not on bb, or statin, will discuss starting tx\par \par f/u 5 months or sooner if needed

## 2022-05-03 ENCOUNTER — EMERGENCY (EMERGENCY)
Facility: HOSPITAL | Age: 67
LOS: 1 days | Discharge: HOME | End: 2022-05-03
Attending: EMERGENCY MEDICINE
Payer: COMMERCIAL

## 2022-05-03 VITALS
RESPIRATION RATE: 18 BRPM | SYSTOLIC BLOOD PRESSURE: 126 MMHG | OXYGEN SATURATION: 100 % | TEMPERATURE: 99 F | DIASTOLIC BLOOD PRESSURE: 75 MMHG

## 2022-05-03 VITALS
WEIGHT: 134.92 LBS | HEIGHT: 65 IN | TEMPERATURE: 97 F | SYSTOLIC BLOOD PRESSURE: 130 MMHG | DIASTOLIC BLOOD PRESSURE: 79 MMHG | HEART RATE: 75 BPM | RESPIRATION RATE: 16 BRPM | OXYGEN SATURATION: 100 %

## 2022-05-03 DIAGNOSIS — Z90.49 ACQUIRED ABSENCE OF OTHER SPECIFIED PARTS OF DIGESTIVE TRACT: Chronic | ICD-10-CM

## 2022-05-03 LAB
ALBUMIN SERPL ELPH-MCNC: 4.5 G/DL — SIGNIFICANT CHANGE UP (ref 3.3–5)
ALP SERPL-CCNC: 99 U/L — SIGNIFICANT CHANGE UP (ref 40–120)
ALT FLD-CCNC: 36 U/L — SIGNIFICANT CHANGE UP (ref 10–45)
ANION GAP SERPL CALC-SCNC: 14 MMOL/L — SIGNIFICANT CHANGE UP (ref 5–17)
ANION GAP SERPL CALC-SCNC: 17 MMOL/L — SIGNIFICANT CHANGE UP (ref 5–17)
APPEARANCE UR: ABNORMAL
AST SERPL-CCNC: 45 U/L — HIGH (ref 10–40)
BACTERIA # UR AUTO: ABNORMAL
BASE EXCESS BLDV CALC-SCNC: -5.3 MMOL/L — LOW (ref -2–2)
BASE EXCESS BLDV CALC-SCNC: -8.2 MMOL/L — LOW (ref -2–2)
BASOPHILS # BLD AUTO: 0.01 K/UL — SIGNIFICANT CHANGE UP (ref 0–0.2)
BASOPHILS NFR BLD AUTO: 0.2 % — SIGNIFICANT CHANGE UP (ref 0–2)
BILIRUB SERPL-MCNC: 0.4 MG/DL — SIGNIFICANT CHANGE UP (ref 0.2–1.2)
BILIRUB UR-MCNC: NEGATIVE — SIGNIFICANT CHANGE UP
BUN SERPL-MCNC: 22 MG/DL — SIGNIFICANT CHANGE UP (ref 7–23)
BUN SERPL-MCNC: 23 MG/DL — SIGNIFICANT CHANGE UP (ref 7–23)
CA-I SERPL-SCNC: 1.12 MMOL/L — LOW (ref 1.15–1.33)
CA-I SERPL-SCNC: 1.2 MMOL/L — SIGNIFICANT CHANGE UP (ref 1.15–1.33)
CALCIUM SERPL-MCNC: 9.1 MG/DL — SIGNIFICANT CHANGE UP (ref 8.4–10.5)
CALCIUM SERPL-MCNC: 9.6 MG/DL — SIGNIFICANT CHANGE UP (ref 8.4–10.5)
CHLORIDE BLDV-SCNC: 107 MMOL/L — SIGNIFICANT CHANGE UP (ref 96–108)
CHLORIDE BLDV-SCNC: 109 MMOL/L — HIGH (ref 96–108)
CHLORIDE SERPL-SCNC: 105 MMOL/L — SIGNIFICANT CHANGE UP (ref 96–108)
CHLORIDE SERPL-SCNC: 107 MMOL/L — SIGNIFICANT CHANGE UP (ref 96–108)
CO2 BLDV-SCNC: 17 MMOL/L — LOW (ref 22–26)
CO2 BLDV-SCNC: 20 MMOL/L — LOW (ref 22–26)
CO2 SERPL-SCNC: 15 MMOL/L — LOW (ref 22–31)
CO2 SERPL-SCNC: 16 MMOL/L — LOW (ref 22–31)
COLOR SPEC: YELLOW — SIGNIFICANT CHANGE UP
CREAT SERPL-MCNC: 0.81 MG/DL — SIGNIFICANT CHANGE UP (ref 0.5–1.3)
CREAT SERPL-MCNC: 0.92 MG/DL — SIGNIFICANT CHANGE UP (ref 0.5–1.3)
DIFF PNL FLD: ABNORMAL
EGFR: 69 ML/MIN/1.73M2 — SIGNIFICANT CHANGE UP
EGFR: 80 ML/MIN/1.73M2 — SIGNIFICANT CHANGE UP
EOSINOPHIL # BLD AUTO: 0 K/UL — SIGNIFICANT CHANGE UP (ref 0–0.5)
EOSINOPHIL NFR BLD AUTO: 0 % — SIGNIFICANT CHANGE UP (ref 0–6)
EPI CELLS # UR: 37 /HPF — HIGH
GAS PNL BLDV: 134 MMOL/L — LOW (ref 136–145)
GAS PNL BLDV: 137 MMOL/L — SIGNIFICANT CHANGE UP (ref 136–145)
GAS PNL BLDV: SIGNIFICANT CHANGE UP
GLUCOSE BLDV-MCNC: 92 MG/DL — SIGNIFICANT CHANGE UP (ref 70–99)
GLUCOSE BLDV-MCNC: 96 MG/DL — SIGNIFICANT CHANGE UP (ref 70–99)
GLUCOSE SERPL-MCNC: 93 MG/DL — SIGNIFICANT CHANGE UP (ref 70–99)
GLUCOSE SERPL-MCNC: 96 MG/DL — SIGNIFICANT CHANGE UP (ref 70–99)
GLUCOSE UR QL: NEGATIVE — SIGNIFICANT CHANGE UP
HCO3 BLDV-SCNC: 16 MMOL/L — LOW (ref 22–29)
HCO3 BLDV-SCNC: 19 MMOL/L — LOW (ref 22–29)
HCT VFR BLD CALC: 40.3 % — SIGNIFICANT CHANGE UP (ref 34.5–45)
HCT VFR BLDA CALC: 38 % — SIGNIFICANT CHANGE UP (ref 34.5–46.5)
HCT VFR BLDA CALC: 41 % — SIGNIFICANT CHANGE UP (ref 34.5–46.5)
HGB BLD CALC-MCNC: 12.8 G/DL — SIGNIFICANT CHANGE UP (ref 11.7–16.1)
HGB BLD CALC-MCNC: 13.6 G/DL — SIGNIFICANT CHANGE UP (ref 11.7–16.1)
HGB BLD-MCNC: 13.1 G/DL — SIGNIFICANT CHANGE UP (ref 11.5–15.5)
HYALINE CASTS # UR AUTO: 2 /LPF — SIGNIFICANT CHANGE UP (ref 0–7)
IMM GRANULOCYTES NFR BLD AUTO: 0.5 % — SIGNIFICANT CHANGE UP (ref 0–1.5)
KETONES UR-MCNC: ABNORMAL
LACTATE BLDV-MCNC: 0.7 MMOL/L — SIGNIFICANT CHANGE UP (ref 0.7–2)
LACTATE BLDV-MCNC: 1.6 MMOL/L — SIGNIFICANT CHANGE UP (ref 0.7–2)
LEUKOCYTE ESTERASE UR-ACNC: ABNORMAL
LYMPHOCYTES # BLD AUTO: 0.84 K/UL — LOW (ref 1–3.3)
LYMPHOCYTES # BLD AUTO: 12.9 % — LOW (ref 13–44)
MAGNESIUM SERPL-MCNC: 2.3 MG/DL — SIGNIFICANT CHANGE UP (ref 1.6–2.6)
MCHC RBC-ENTMCNC: 29 PG — SIGNIFICANT CHANGE UP (ref 27–34)
MCHC RBC-ENTMCNC: 32.5 GM/DL — SIGNIFICANT CHANGE UP (ref 32–36)
MCV RBC AUTO: 89.4 FL — SIGNIFICANT CHANGE UP (ref 80–100)
MONOCYTES # BLD AUTO: 0.81 K/UL — SIGNIFICANT CHANGE UP (ref 0–0.9)
MONOCYTES NFR BLD AUTO: 12.4 % — SIGNIFICANT CHANGE UP (ref 2–14)
NEUTROPHILS # BLD AUTO: 4.82 K/UL — SIGNIFICANT CHANGE UP (ref 1.8–7.4)
NEUTROPHILS NFR BLD AUTO: 74 % — SIGNIFICANT CHANGE UP (ref 43–77)
NITRITE UR-MCNC: POSITIVE
NRBC # BLD: 0 /100 WBCS — SIGNIFICANT CHANGE UP (ref 0–0)
PCO2 BLDV: 28 MMHG — LOW (ref 39–42)
PCO2 BLDV: 33 MMHG — LOW (ref 39–42)
PH BLDV: 7.36 — SIGNIFICANT CHANGE UP (ref 7.32–7.43)
PH BLDV: 7.37 — SIGNIFICANT CHANGE UP (ref 7.32–7.43)
PH UR: 5.5 — SIGNIFICANT CHANGE UP (ref 5–8)
PLATELET # BLD AUTO: 271 K/UL — SIGNIFICANT CHANGE UP (ref 150–400)
PO2 BLDV: 106 MMHG — HIGH (ref 25–45)
PO2 BLDV: 22 MMHG — LOW (ref 25–45)
POTASSIUM BLDV-SCNC: 3.9 MMOL/L — SIGNIFICANT CHANGE UP (ref 3.5–5.1)
POTASSIUM BLDV-SCNC: 8.1 MMOL/L — CRITICAL HIGH (ref 3.5–5.1)
POTASSIUM SERPL-MCNC: 3.8 MMOL/L — SIGNIFICANT CHANGE UP (ref 3.5–5.3)
POTASSIUM SERPL-MCNC: 5.2 MMOL/L — SIGNIFICANT CHANGE UP (ref 3.5–5.3)
POTASSIUM SERPL-SCNC: 3.8 MMOL/L — SIGNIFICANT CHANGE UP (ref 3.5–5.3)
POTASSIUM SERPL-SCNC: 5.2 MMOL/L — SIGNIFICANT CHANGE UP (ref 3.5–5.3)
PROT SERPL-MCNC: 8.1 G/DL — SIGNIFICANT CHANGE UP (ref 6–8.3)
PROT UR-MCNC: ABNORMAL
RAPID RVP RESULT: DETECTED
RBC # BLD: 4.51 M/UL — SIGNIFICANT CHANGE UP (ref 3.8–5.2)
RBC # FLD: 13.7 % — SIGNIFICANT CHANGE UP (ref 10.3–14.5)
RBC CASTS # UR COMP ASSIST: 5 /HPF — HIGH (ref 0–4)
SAO2 % BLDV: 31.3 % — LOW (ref 67–88)
SAO2 % BLDV: 99.6 % — HIGH (ref 67–88)
SARS-COV-2 RNA SPEC QL NAA+PROBE: DETECTED
SODIUM SERPL-SCNC: 136 MMOL/L — SIGNIFICANT CHANGE UP (ref 135–145)
SODIUM SERPL-SCNC: 138 MMOL/L — SIGNIFICANT CHANGE UP (ref 135–145)
SP GR SPEC: 1.03 — HIGH (ref 1.01–1.02)
TROPONIN T, HIGH SENSITIVITY RESULT: 10 NG/L — SIGNIFICANT CHANGE UP (ref 0–51)
TROPONIN T, HIGH SENSITIVITY RESULT: 12 NG/L — SIGNIFICANT CHANGE UP (ref 0–51)
UROBILINOGEN FLD QL: NEGATIVE — SIGNIFICANT CHANGE UP
WBC # BLD: 6.51 K/UL — SIGNIFICANT CHANGE UP (ref 3.8–10.5)
WBC # FLD AUTO: 6.51 K/UL — SIGNIFICANT CHANGE UP (ref 3.8–10.5)
WBC UR QL: 4 /HPF — SIGNIFICANT CHANGE UP (ref 0–5)

## 2022-05-03 PROCEDURE — 36415 COLL VENOUS BLD VENIPUNCTURE: CPT

## 2022-05-03 PROCEDURE — 87186 SC STD MICRODIL/AGAR DIL: CPT

## 2022-05-03 PROCEDURE — 83735 ASSAY OF MAGNESIUM: CPT

## 2022-05-03 PROCEDURE — 85025 COMPLETE CBC W/AUTO DIFF WBC: CPT

## 2022-05-03 PROCEDURE — 80048 BASIC METABOLIC PNL TOTAL CA: CPT

## 2022-05-03 PROCEDURE — 71045 X-RAY EXAM CHEST 1 VIEW: CPT

## 2022-05-03 PROCEDURE — 71045 X-RAY EXAM CHEST 1 VIEW: CPT | Mod: 26

## 2022-05-03 PROCEDURE — 81001 URINALYSIS AUTO W/SCOPE: CPT

## 2022-05-03 PROCEDURE — 84295 ASSAY OF SERUM SODIUM: CPT

## 2022-05-03 PROCEDURE — 85018 HEMOGLOBIN: CPT

## 2022-05-03 PROCEDURE — 82947 ASSAY GLUCOSE BLOOD QUANT: CPT

## 2022-05-03 PROCEDURE — 87086 URINE CULTURE/COLONY COUNT: CPT

## 2022-05-03 PROCEDURE — 82330 ASSAY OF CALCIUM: CPT

## 2022-05-03 PROCEDURE — 82803 BLOOD GASES ANY COMBINATION: CPT

## 2022-05-03 PROCEDURE — 84132 ASSAY OF SERUM POTASSIUM: CPT

## 2022-05-03 PROCEDURE — 82435 ASSAY OF BLOOD CHLORIDE: CPT

## 2022-05-03 PROCEDURE — 93005 ELECTROCARDIOGRAM TRACING: CPT

## 2022-05-03 PROCEDURE — 85014 HEMATOCRIT: CPT

## 2022-05-03 PROCEDURE — 99285 EMERGENCY DEPT VISIT HI MDM: CPT | Mod: 25

## 2022-05-03 PROCEDURE — 93010 ELECTROCARDIOGRAM REPORT: CPT

## 2022-05-03 PROCEDURE — 80053 COMPREHEN METABOLIC PANEL: CPT

## 2022-05-03 PROCEDURE — 0225U NFCT DS DNA&RNA 21 SARSCOV2: CPT

## 2022-05-03 PROCEDURE — 84484 ASSAY OF TROPONIN QUANT: CPT

## 2022-05-03 PROCEDURE — 83605 ASSAY OF LACTIC ACID: CPT

## 2022-05-03 RX ORDER — SODIUM CHLORIDE 9 MG/ML
1000 INJECTION INTRAMUSCULAR; INTRAVENOUS; SUBCUTANEOUS ONCE
Refills: 0 | Status: COMPLETED | OUTPATIENT
Start: 2022-05-03 | End: 2022-05-03

## 2022-05-03 RX ADMIN — SODIUM CHLORIDE 666.67 MILLILITER(S): 9 INJECTION INTRAMUSCULAR; INTRAVENOUS; SUBCUTANEOUS at 19:08

## 2022-05-03 NOTE — ED PROVIDER NOTE - PROGRESS NOTE DETAILS
Han Diaz, PGY3: Patient signed out to me. Here with generalized weakness and diarrhea. Awaiting repeat trop and UA. Han Diaz, PGY3: UA w/ +nitrites, moderate leuks, 37 epithelials. Patient denies urinary sx, will hold abx and await culture. Patient COVID+, likely explaining symptoms. Will move from 3 to room 17, RN aware. Awaiting repeat trop prior to discharge. Han Diaz, PGY3: Repeat trop 12->10. No active CP or SOB. Feeling much better after fluids. Patient aware she is COVID+, all questions answered. Ambulating w/ steady gait. Discussed return precautions and all questions answered. Pt in agreement w/ plan. CAOx3, NAD, VSS. Stable for d/c. Han Diaz, PGY3: Repeat trop 12->10. No active CP or SOB. Feeling much better after fluids. Patient aware she is COVID+, all questions answered. CXR clear, not hypoxic. Ambulating w/ steady gait. Discussed return precautions and all questions answered. Pt in agreement w/ plan. CAOx3, NAD, VSS. Stable for d/c.

## 2022-05-03 NOTE — ED PROVIDER NOTE - RAPID ASSESSMENT
67y F with PMHx of NSTEMI on 4/4/22 with 2 stents placed presents to the ED with weakness starting yesterday. Also endorses decreased PO intake. Patient also presented to [facility] ED on 4/15/22 with rash/hives and was directed to followup with an allergist. Denies lightheadedness, cough, fevers, chills, body aches, headache, CP, SOB, dysuria, dark stools. Patient is well appearing and in no acute distress.    Scribe Statement: I, Devora Palomo, attest that this documentation has been prepared under the direction and in the presence of Nidia Pérez) 67y F with PMHx of NSTEMI on 4/4/22 with 2 stents placed presents to the ED with generalized weakness starting yesterday. Also endorses decreased PO intake and appetite. Patient also presented to [facility] ED on 4/15/22 with rash/hives and was directed to followup with an allergist. Denies lightheadedness, cough, fevers, chills, body aches, headache, CP, SOB, dysuria, dark stools. Patient is well appearing and in no acute distress.    Scribe Statement: I, Devora Palomo, attest that this documentation has been prepared under the direction and in the presence of Nidia Pérez (MD)    I, Dr. Pérez, personally performed the service described in the documentation recorded by the scribe in my presence, and it accurately and completely records my words and actions.

## 2022-05-03 NOTE — ED ADULT NURSE REASSESSMENT NOTE - NS ED NURSE REASSESS COMMENT FT1
Patient evaluated by "[facility]" provider. Laboratory specimen ordered and obtained via peripheral IV access device. Laboratory specimen sent to laboratory. 18 Gauge peripheral IV access device placed to the right AC by CRISTY Church RN. Peripheral IV access device tolerated well, positive blood return, flushing without resistance, and secured with securement device, transparent dressing, and tape. COVID swab obtained and sent to the laboratory.

## 2022-05-03 NOTE — ED PROVIDER NOTE - NSFOLLOWUPINSTRUCTIONS_ED_ALL_ED_FT
COVID-19 (Coronavirus Disease 2019)    WHAT YOU NEED TO KNOW:    What do I need to know about coronavirus disease 2019 (COVID-19)? COVID-19 is the disease caused by the novel (new) coronavirus first discovered in December 2019. Coronaviruses generally cause upper respiratory (nose, throat, and lung) infections, such as a cold. The new virus can also cause serious lower respiratory conditions, such as pneumonia or acute respiratory distress syndrome (ARDS). Anyone can develop serious problems from the new virus, but your risk is higher if you are 65 or older. A weak immune system, diabetes, or a heart or lung condition can also increase your risk.    What are the signs and symptoms of COVID-19? You may not develop any signs or symptoms. Signs and symptoms that do develop usually start about 5 days after infection but can take 2 to 14 days. Signs and symptoms range from mild to severe. You may feel like you have the flu or a bad cold. Information on COVID-19 is still being learned. Tell your healthcare provider if you think you were infected but develop signs or symptoms not listed below:  •A cough  •Shortness of breath or trouble breathing that may become severe  •A fever of at least 100.4°F, or 38°C (may be lower in adults 65 or older)  •Chills that might include shaking  •Muscle pain, body aches, or a headache  •A sore throat  •Suddenly not being able to taste or smell anything  •Feeling mentally and physically tired (fatigue)  •Congestion (stuffy head and nose), or a runny nose.  •Diarrhea, nausea, or vomiting    How is COVID-19 diagnosed? If you think you have COVID-19, call your healthcare provider. In some areas, testing is only done if a person has severe symptoms or is hospitalized. Testing is done more widely in other places. Your provider will tell you what to do based on your symptoms and the rules in your area. In general, the following may be used:   •A viral test shows if you have a current infection. Samples are taken from your nose and throat, usually with swabs. You may need to wait several days to get the test results. Your healthcare provider will tell you how to get your results. You will need to quarantine (stay physically away from others) until you get your results. If results show you have COVID-19, you will need to quarantine until you are well. Your provider or other health official may give you more directions. You will also need to prevent another infection until it is known if you can get COVID-19 again.  •An antibody test shows if you had a past infection. Blood samples are used for this test. Antibodies are made by your immune system to attack the virus that causes COVID-19. Antibodies will form 1 to 3 weeks after you are infected. It is not known if antibodies prevent a second infection, or for how long a person might be protected. If you have antibodies, you will still need to be careful around others until more is known.  •CT scans or x-rays may be used to check for signs of pneumonia. The 2019 coronavirus causes a specific kind of pneumonia, usually in both lungs.    How is COVID-19 treated? No medicine or specific treatment is currently approved for COVID-19. The following may be used to manage your symptoms or treat the effects of COVID-19:   •Mild symptoms may get better on their own. If you do not need to be treated in a hospital, you will be given instructions to use at home. Your condition will be closely monitored. You will need to watch for worsening symptoms and seek immediate care if needed. Talk to your healthcare provider about the following:?Relieve your symptoms. To soothe a sore throat, gargle with warm salt water, or use throat lozenges or a throat spray. Your healthcare provider may recommend a cough medicine. Drink more liquids to thin and loosen mucus and to prevent dehydration. Use decongestants or saline drops as directed for nasal congestion.  ?NSAIDs or acetaminophen can help lower a fever and relieve body aches or a headache. Follow directions. If not taken correctly, NSAIDs can cause kidney damage and acetaminophen can cause liver damage.    •Severe or life-threatening symptoms are treated in the hospital. You may need a combination of the following:?Medicines may be given to reduce inflammation or to fight the virus. You may also need blood thinners to prevent or treat blood clots. If you have a deep vein thrombosis (DVT) or pulmonary embolism (PE), you may need to keep using blood thinners for 3 months.  ?Extra oxygen may be given if you have respiratory failure. This means your lungs cannot get enough oxygen into your blood and out to your organs. Extra oxygen can help prevent organ failure.  ?A ventilator may be used to help you breathe.  ?Convalescent plasma (part of blood) from a patient who has recovered from COVID-19 may be used. The plasma contains antibodies that can help your body fight the infection. Convalescent plasma is only given to patients who have severe signs and symptoms.    How does the 2019 coronavirus spread? The virus spreads quickly and easily. You can become infected if you are in contact with a large amount of the virus, even for a short time. You can also become infected by being around a small amount of virus for a long time. The following are ways the virus is thought to spread, but more information may be coming:   •Droplets are the most common way all coronaviruses spread. The virus can travel in droplets that form when a person talks, coughs, or sneezes. Anyone who breathes in the droplets or gets them in his or her eyes can become infected with the virus. Close personal contact with an infected person is thought to be the main way the virus spreads. Close personal contact means you are within 6 feet (2 meters) of the person.  •Person-to-person contact can spread the virus. For example, a person with the virus on his or her hands can spread it by shaking hands with someone. At this time, it does not appear that the virus can be passed to a baby during pregnancy or delivery. The baby can be infected after he or she is born through person-to-person contact. The virus also does not appear to spread in breast milk. If you are pregnant or breastfeeding, talk to your healthcare provider or obstetrician about any concerns you have.  •The virus can stay on objects and surfaces. A person can get the virus on his or her hands by touching the object or surface. Infection happens if the person then touches his or her eyes or mouth with unwashed hands. It is not yet known how long the virus can stay on an object or surface. That is why it is important to clean all surfaces that are used regularly.  •An infected animal may be able to infect a person who touches it. This may happen at live markets or on a farm.    How can everyone lower the risk for COVID-19? The best way to prevent infection is to avoid anyone who is infected, but this can be hard to do. An infected person can spread the virus before signs or symptoms begin, or even if signs or symptoms never develop. The following can help lower the risk for infection:   Limit the Spread of Infectious Disease    •Wash your hands often throughout the day. Use soap and water. Rub your soapy hands together, lacing your fingers. Wash the front and back of each hand, and in between your fingers. Use the fingers of one hand to scrub under the fingernails of the other hand. Wash for at least 20 seconds. Rinse with warm, running water for several seconds. Then dry your hands with a clean towel or paper towel. Use hand  that contains alcohol if soap and water are not available. Do not touch your eyes, nose, or mouth without washing your hands first. Teach children how to wash their hands and use hand .  Handwashing  •Cover a sneeze or cough. This prevents droplets from traveling from you to others. Turn your face away and cover your mouth and nose with a tissue. Throw the tissue away. Use the bend of your arm if a tissue is not available. Then wash your hands well with soap and water or use hand . Turn and cover your face if you are around someone who is sneezing or coughing. Teach children how to cover a cough or sneeze.  •Follow worldwide, national, and local social distancing guidelines. Social distancing means people avoid close physical contact so the virus cannot spread from one person to another. Keep at least 6 feet (2 meters) between you and others. Also keep this distance from anyone who comes to your home, such as someone making a delivery.  •Make a habit of not touching your face. It is not known how long the virus can stay on objects and surfaces. If you get the virus on your hands, you can transfer it to your eyes, nose, or mouth and become infected. You can also transfer it to objects, surfaces, or people. Be aware of what you touch when you go out. Examples include handrails and elevator buttons. Try not to touch anything with bare hands unless it is necessary. Wash your hands before you leave your home and when you return.  •Clean and disinfect high-touch surfaces and objects often. Use a disinfecting solution or wipes. You can make a solution by diluting 4 teaspoons of bleach in 1 quart (4 cups) of water. Clean and disinfect even if you think no one living in or coming to your home is infected with the virus. You can wipe items with a disinfecting cloth before you bring them into your home. Wash your hands after you handle anything you bring into your home.  •Make your immune system as healthy as possible. A weakened immune system makes you more vulnerable to the new coronavirus. No COVID-19 vaccine is available yet. Vaccines such as the flu and pneumonia vaccines can help your immune system. Your healthcare provider can tell you which vaccines to get, and when to get them. Keep your immune system as strong as possible. Do not smoke. Eat healthy foods, exercise regularly, and try to manage stress. Go to bed and wake up at the same times each day.     How do I follow social distancing guidelines to help lower the risk for COVID-19? National and local social distancing rules vary. Rules may change over time as restrictions are lifted. Restrictions may return if an outbreak happens where you live. It is important to know and follow all current social distancing rules in your area. The following are general guidelines:  •Limit trips out of your home. You may be able to have food, medicines, and other supplies delivered. If possible, have delivered items left at your door or other area. Try not to have someone hand you an item. You will be so close to the person that the virus can spread between you.  •Do not have close physical contact with anyone who does not live in your home. Do not shake hands with, hug, or kiss a person as a greeting. Stand or walk as far from others as possible. If you must use public transportation (such as a bus or subway), try to sit or stand away from others. You can stay safely connected with others through phone calls, e-mail messages, social media websites, and video chats. Check in on anyone who may be having a hard time socially distancing, or who lives alone. Ask administrators at nursing homes or long-term care facilities how you can safely communicate with someone living there.  •Wear a cloth face covering around others who do not live in your home. Face coverings help prevent the virus from spreading to others in droplets. You can use a clear face covering if someone needs to read your lips. This is a cloth covering that has plastic over the mouth area so your lips can be seen. Do not use coverings that have breathing valves or vents. The virus can travel out of the valve or vent and be spread to others. Do not take your covering off to talk, cough, or sneeze. Do not use coverings on children younger than 2 years or on anyone who has breathing problems or cannot remove it.  •Only allow medical or other necessary professionals into your home. Wear your face covering, and remind professionals to wear a face covering. Remind them to wash their hands when they arrive and before they leave. Do not let anyone who does not live in your home in, even if the person is not sick. A person can pass the virus to others before symptoms of COVID-19 begin. Some people never even develop symptoms. Children commonly have mild symptoms or no symptoms. It may be hard to tell a child not to hug or kiss you. Explain that this is how he or she can help you stay healthy.  •Do not go to someone else's home unless it is necessary. Do not go over to visit, even if the person is lonely. Only go if you need to help him or her. Make sure you both wear face coverings while you are there.  •Avoid large gatherings and crowds. Gatherings or crowds of 10 or more individuals can cause the virus to spread. Examples of gatherings include parties, sporting events, Congregation services, and conferences. Crowds may form at beaches, [place], and tourist attractions. Protect yourself by staying away from large gatherings and crowds.  •Ask your healthcare provider for other ways to have appointments. You may be able to have appointments without having to go into the provider's office. Some providers offer phone, video, or other types of appointments. You may also be able to get prescriptions for a few months of your medicines at a time.  •Stay safe if you must go out to work. You may have a job that can only be done outside your home. Keep physical distance between you and other workers as much as possible. Follow your employer's rules so everyone stays safe.      What should I do if I have COVID-19 and am recovering at home? Healthcare providers will give you specific instructions to follow. The following are general guidelines to remind you how to keep others safe until you are well:   •Wash your hands often. Use soap and water as much as possible. You can use hand  that contains alcohol if soap and water are not available. Do not share towels with anyone. If you use paper towels, throw them away in a lined trash can kept in your room or area. Use a covered trash can, if possible.  •Do not go out of your home unless it is necessary. You may have to go to your healthcare provider's office for check-ups or to get prescription refills. Do not arrive at the provider's office without an appointment. Providers have to make their offices safe for staff and other patients.  •Do not have close physical contact with anyone unless it is necessary. Only have close physical contact with a person giving direct care, or a baby or child you must care for. Family members and friends should not visit you. If possible, stay in a separate area or room of your home if you live with others. No one should go into the area or room except to give you care. You can visit with others by phone, video chat, e-mail, or similar systems. It is important to stay connected with others in your life while you recover.  •Wear a face covering while others are near you. This can help prevent droplets from spreading the virus when you talk, sneeze, or cough. Put the covering on before anyone comes into your room or area. Remind the person to cover his or her nose and mouth before going in to provide care for you.  •Do not share items. Do not share dishes, towels, or other items with anyone. Items need to be washed after you use them.  •Protect your baby. Wash your hands with soap and water often throughout the day. Wear a clean face covering while you breastfeed, or while you express or pump breast milk. If possible, ask someone who is well to care for your baby. You can put breast milk in bottles for the person to use, if needed. Talk to your healthcare provider if you have any questions or concerns about caring for or bonding with your baby. He or she will tell you when to bring your baby in for check-ups and vaccines. He or she will also tell you what to do if you think your baby was infected with the new virus.  •Do not handle live animals. Until more is known, it is best not to touch, play with, or handle live animals. Some animals, including pets, have been infected with the new coronavirus. Do not handle or care for animals until you are well. Care includes feeding, petting, and cuddling your pet. Do not let your pet lick you or share your food. Ask someone who is not infected to take care of your pet, if possible. If you must care for a pet, wear a face covering. Wash your hands before and after you give care.  •Follow directions from your healthcare provider for being around others after you recover. You will need to wait at least 10 days after symptoms first appeared. Then you will need to have no fever for 24 hours without fever medicine, and no other symptoms. A loss of taste or smell may continue for several months. It is considered okay to be around others if this is your only symptom. It is not known for sure if or for how long a recovered person can pass the virus to others. Your provider may give you instructions, such as continuing social distancing or wearing a face covering around others.  How should I take care of someone who has COVID-19? If the person lives in another home, arrange for a time to give care. Remember to bring a few pairs of disposable gloves and a cloth face covering. The following are general guidelines to help you safely care for anyone who has COVID-19:  •Wash your hands often. Wash before and after you go into the person's home, area, or room. Throw paper towels away in a lined trash can that has a lid, if possible.  •Do not allow others to go near the person. No one should come into the person's home unless it is necessary. If possible, the person should be in a separate area or room if he or she lives with others. Keep the room's door shut unless you need to go in or out. Have others call, video chat, or e-mail the person if he or she is feeling well enough. The person may feel lonely if he or she is kept separate for a long period of time. Safe communication can help him or her stay connected to family and friends.  •Make sure the person's room has good air flow. You may be able to open the window if the weather allows. An air conditioner can also be turned on to help air move.  •Contact the person before you go in to give care. Make sure the person is wearing a face covering. Remind him or her to wash his or her hands with soap and water. He or she can use hand  that contains alcohol if soap and water are not available. Put on a face covering before you go in to give care.  •Wear gloves while you give care and clean. Clean items the person uses often. Clean countertops, cooking surfaces, and the fronts and insides of the microwave and refrigerator. Clean the shower, toilet, the area around the toilet, the sink, the area around the sink, and faucets. Gather used laundry or bedding. Wash and dry items on the warmest settings the fabric allows. Wash dishes and silverware in hot, soapy water or in a .  •Anything you throw away needs to go into a lined trash can. When you need to empty the trash, close the open end of the lining and tie it closed. This helps prevent items the virus is on from spilling out of the trash. Remove your gloves and throw them away. Wash your hands.    Where can I find more information?   •Centers for Disease Control and Prevention  [address] [place] [address]  Phone: 1-398.865.3089  Web Address: http://www.cdc.gov    What should I do if I think I or someone I know may be infected? Do the following to protect others:   •If emergency care is needed, tell the  about the possible infection, or call ahead and tell the emergency department.  •Call a healthcare provider for instructions if symptoms are mild. Anyone who may be infected should not arrive without calling first. The provider will need to protect staff members and other patients.  •The person who may be infected needs to wear a face covering while getting medical care. This will help lower the risk of infecting others. Coverings are not used for anyone who is younger than 2 years, has breathing problems, or cannot remove it. The provider can give you instructions for anyone who cannot wear a covering.    Call your local emergency number (911 in the ) or an emergency department if:   •You have trouble breathing or shortness of breath at rest.  •You have chest pain or pressure that lasts longer than 5 minutes.  •You become confused or hard to wake.  •Your lips or face are blue.  •You have a fever of 104°F (40°C) or higher.  When should I call my doctor?   •You do not have symptoms of COVID-19 but had close physical contact within 14 days with someone who tested positive.  •You have questions or concerns about your condition or care.    CARE AGREEMENT:  You have the right to help plan your care. Learn about your health condition and how it may be treated. Discuss treatment options with your healthcare providers to decide what care you want to receive. You always have the right to refuse treatment.

## 2022-05-03 NOTE — ED PROVIDER NOTE - PHYSICAL EXAMINATION
Physical Exam:  Gen: NAD, AOx3, non-toxic appearing, able to ambulate without assistance  Head: NCAT  HEENT: EOMI, PEERL, normal conjunctiva, tongue midline, oral mucosa moist  Lung: CTAB, no respiratory distress, no wheezes/rhonchi/rales B/L, speaking in full sentences  CV: RRR, no murmurs, rubs or gallops  Abd: soft, NT, ND, no guarding, no rigidity, no rebound tenderness, no CVA tenderness   MSK: no visible deformities, ROM normal in UE/LE, no back pain  Neuro: No focal sensory or motor deficits  Skin: Warm, well perfused, no rash, no leg swelling  Psych: normal affect, calm  Anisa Gonzalez D.O. -Resident

## 2022-05-03 NOTE — ED PROVIDER NOTE - NS ED ROS FT
CONSTITUTIONAL: +weakness, no fevers, no chills, no lightheadedness, no dizziness  EYES: no visual changes, no eye pain  EARS: no ear drainage, no ear pain, no change in hearing  NOSE: no nasal congestion  MOUTH/THROAT: no sore throat  CV: No chest pain, no palpitations  RESP: No SOB, no cough  GI: No n/v/d, no abd pain  : no dysuria, no hematuria, no flank pain  MSK: no back pain, no extremity pain  SKIN: no rashes  NEURO: no headache, no focal weakness, no decreased sensation/paresthesias   PSYCHIATRIC: no known mental health issues.

## 2022-05-03 NOTE — ED ADULT NURSE NOTE - OBJECTIVE STATEMENT
Patient is a 67y female presenting to the ED ambulatory from home with c/o weakness. Patient A&Ox4. Patient is speaking coherently in full sentences. Patient reports feeling weak and fatigued for the past week. Patient is s/p cardiac stent 3 weeks ago; states she was having chest discomfort prior to the stent placement. Denies any chest discomfort, SOB, fever/chills, abdominal pain, headache. ECG performed at bedside. Patient placed on cardiac monitor. Respirations spontaneous, even, and non-labored. Abdomen soft, non-distended and non-tender upon palpation.

## 2022-05-03 NOTE — ED PROVIDER NOTE - PATIENT PORTAL LINK FT
You can access the FollowMyHealth Patient Portal offered by [facility] by registering at the following website: [email or web address]. By joining [facility]’s FollowMyHealth portal, you will also be able to view your health information using other applications (apps) compatible with our system.

## 2022-05-03 NOTE — ED PROVIDER NOTE - ATTENDING CONTRIBUTION TO CARE
Private Physician Debbie Benito PCP, Demarcus Hector  67y female pmh NSTEMI few weeks ago. SP ptca w#2 stents. NO dm,habits,htn,hldk,cancer,cva, Pt comes to ed c/o 'feeling very weak" past two days. No cp/sob/cough/abd pain/nv/hemoptysis/gi bleeding/hematuria/dysuiria/back pain/falls/head trauma/ha. Pt symptoms have waxed and waned over past two days. No ppt/alleviating factors. "but all I wanted to do was sleep" Pt has had watery diarrhea yesterday w/o abd pain. Took imodium. w slight improvement. PE WDWN female awake alert normocephalic atraumatic neck supple chest clear anterior & posterior cv no rubs, gallops or murmurs abd soft +bs no mass guarding neuro no focal defects  Shayan Ordaz MD, Facep

## 2022-05-03 NOTE — ED PROVIDER NOTE - OBJECTIVE STATEMENT
67y F w/ PMHx NSTEMI on 4/4/22 s/p 2 stents presenting to the ED with generalized weakness that began yesterday. Also states she has had decreased PO intake since onset of symptoms. States she had episode of loose stool yesterday, took immodium and sttes diarrhea has improved. Patient otherwise denies fever, lightheadedness, chills, sob, cough, chest pain, nausea, vomiting, dysuria, hematuria, bloody stools.

## 2022-05-04 ENCOUNTER — TRANSCRIPTION ENCOUNTER (OUTPATIENT)
Age: 67
End: 2022-05-04

## 2022-05-06 NOTE — ED POST DISCHARGE NOTE - DETAILS
5/6/22: left VM for call back. - Tori Wesley PA-C 5/7 left message with 1522 call back ROSE Prasad 5/8 spoke with pt, made aware of results, started abx and sent to pharmacy of choice. advised to follow up after treatment to ensure resolution. patient demonstrates understanding of plan and return precautions - Blanca Frost PA-C

## 2022-05-08 RX ORDER — CEFDINIR 250 MG/5ML
1 POWDER, FOR SUSPENSION ORAL
Qty: 20 | Refills: 0
Start: 2022-05-08 | End: 2022-05-17

## 2022-05-11 RX ORDER — ASPIRIN ENTERIC COATED TABLETS 81 MG 81 MG/1
81 TABLET, DELAYED RELEASE ORAL
Qty: 90 | Refills: 1 | Status: ACTIVE | COMMUNITY

## 2022-05-11 RX ORDER — NIFEDIPINE 60 MG/1
60 TABLET, EXTENDED RELEASE ORAL
Qty: 90 | Refills: 1 | Status: ACTIVE | COMMUNITY
Start: 1900-01-01 | End: 1900-01-01

## 2022-06-02 ENCOUNTER — APPOINTMENT (OUTPATIENT)
Dept: CARDIOLOGY | Facility: CLINIC | Age: 67
End: 2022-06-02
Payer: COMMERCIAL

## 2022-06-02 VITALS
DIASTOLIC BLOOD PRESSURE: 81 MMHG | BODY MASS INDEX: 21.68 KG/M2 | HEIGHT: 64 IN | SYSTOLIC BLOOD PRESSURE: 134 MMHG | OXYGEN SATURATION: 98 % | TEMPERATURE: 98.4 F | WEIGHT: 127 LBS | HEART RATE: 71 BPM

## 2022-06-02 DIAGNOSIS — I25.10 ATHEROSCLEROTIC HEART DISEASE OF NATIVE CORONARY ARTERY W/OUT ANGINA PECTORIS: ICD-10-CM

## 2022-06-02 DIAGNOSIS — Z98.61 ATHEROSCLEROTIC HEART DISEASE OF NATIVE CORONARY ARTERY W/OUT ANGINA PECTORIS: ICD-10-CM

## 2022-06-02 PROCEDURE — 99215 OFFICE O/P EST HI 40 MIN: CPT

## 2022-06-02 NOTE — DISCUSSION/SUMMARY
[FreeTextEntry1] : 67F CAD/PCI presents for f/u\par \par CAD\par -no cp or sob\par -cont dapt with asa and brilinta (plan to cont brilinta for 6 mo course, through 11/1/22)\par -exercise encouraged. \par -pt to call today with home med list, if not already on statin, bb, will plan to add both to regimen\par \par HCM\par -will check stephanie and AAA screening to eval for arterial plaque in setting of known CAD\par -pt to call today with home medication list\par \par f/u 11/22 unless requires sooner.

## 2022-06-02 NOTE — PHYSICAL EXAM
[Well Developed] : well developed [Well Nourished] : well nourished [Normal Venous Pressure] : normal venous pressure [Normal S1, S2] : normal S1, S2 [No Murmur] : no murmur [Clear Lung Fields] : clear lung fields [Good Air Entry] : good air entry [Soft] : abdomen soft [Non Tender] : non-tender [Normal Gait] : normal gait [No Edema] : no edema [Moves all extremities] : moves all extremities [No Focal Deficits] : no focal deficits [Alert and Oriented] : alert and oriented

## 2022-06-02 NOTE — HISTORY OF PRESENT ILLNESS
[FreeTextEntry1] : 67F CAD/PCI presents for f/u\par interventional cards: Dr Simona Hector\par \par Previously,\par on 4/5/22, pt presented to [facility] with CP, found to have a trop 153 up to 279. EKG with anterolateral TWI. pt transferred to [facility] and underwent a cardiac cath, found to have a 90% LAD lesion, and received a 3.00 X 18 ANAIS ALYSHA, and a 2.75 X 12 ANAIS ALYSHA. pt to be continued on asa, and brilinta for one year per interventional cards recs. \par TTE showing a LV EF 55%\par pt last seen in cardiology to establish outpt care 4/2022.  At that time, pt feeling well overall without issues.\par \par Pt now presents for f/u.\par today,\par \par pt overall feelign well. states that yesterday she thought her feet were swollen but currently are resolved. pt back to being active, not exercising. \par \par on dapt with asa and brilinta. plan for 6 mo brilinta course -through 11/1/22. \par otherwise pt with no complaints. denies sob, palpitations, syncope, diaphoresis. \par \par \par Exercise: none\par Diet: none\par \par Prior cardiac workup:\par Recent labs:\par  \par \par  \par Med hx: CAD/PCI\par OBGYN hx: one child, denies prev hx of preeclampsia, gest htn, gest dm, or peripartum cardiomyopathy.  Currently post menopause. No hx of miscarriage. \par Sx hx: cholecystectomy\par Fam hx: no known cardiac hx\par Social hx: lives in [place] with son. has a twin sister who lives in [place]. [employer]. no tob, etho, no drugs. \par Meds: asa brilinta (doesn’t remember the rest)\par Allergies: nkda\par

## 2022-06-02 NOTE — CARDIOLOGY SUMMARY
[de-identified] : 4/2022:\par Left Ventricle: The left ventricular internal cavity size is normal. \par Increased relative wall thickness with normal mass index consistent with \par left ventricular concentric remodeling.\par Global LV systolic function was normal. Left ventricular ejection \par fraction, by visual estimation, is 55 to 60%. Normal segmental left \par ventricular systolic function. E/A reversal consistent with reduced LV \par compliance.\par Right Ventricle: Normal right ventricular size and function.\par Left Atrium: The left atrium is normal in size.\par Right Atrium: The right atrium is normal in size.\par Pericardium: There is no evidence of pericardial effusion.\par Mitral Valve: Thickening of the anterior mitral valve leaflet. Trace \par mitral valve regurgitation is seen.\par Tricuspid Valve: The tricuspid valve is not well seen. Mild tricuspid \par regurgitation is visualized.\par Aortic Valve: The aortic valve was not well visualized. Peak transaortic \par gradient equals 8.9 mmHg, mean transaortic gradient equals 4.7 mmHg, the \par calculated aortic valve area equals 3.11 cm² by the continuity equation \par consistent with normally opening aortic valve. The aortic valve mean \par gradient is 4.7 mmHg consistent with normally opening aortic valve. \par Trivial aortic valve regurgitation is seen.\par Pulmonic Valve: The pulmonic valve was not well visualized. Mild pulmonic \par valve regurgitation.\par Aorta: Aortic root measured at Sinus of Valsalva is normal.\par Pulmonary Artery: The pulmonary artery is not well seen.\par  [de-identified] : 4/2022:\par Diagnostic Findings: \par \par Coronary Angiography \par The coronary circulation is right dominant.  \par \par LM \par Left main artery: Angiography shows no disease.  \par \par LAD \par Proximal left anterior descending: Angiography shows minor\par irregularities. Mid left anterior descending: There is a 90 %\par stenosis. Distal left anterior descending: Angiography shows minor\par irregularities.\par \par CX \par Circumflex: Angiography shows minor irregularities.  \par \par RCA \par Right coronary artery: Angiography shows minor irregularities.  \par

## 2022-06-08 ENCOUNTER — RX RENEWAL (OUTPATIENT)
Age: 67
End: 2022-06-08

## 2022-06-08 RX ORDER — ATORVASTATIN CALCIUM 40 MG/1
40 TABLET, FILM COATED ORAL
Qty: 90 | Refills: 0 | Status: ACTIVE | COMMUNITY
Start: 2022-06-08 | End: 1900-01-01

## 2022-06-08 RX ORDER — METOPROLOL TARTRATE 25 MG/1
25 TABLET, FILM COATED ORAL TWICE DAILY
Qty: 90 | Refills: 0 | Status: ACTIVE | COMMUNITY
Start: 2022-06-08 | End: 1900-01-01

## 2022-06-14 ENCOUNTER — APPOINTMENT (OUTPATIENT)
Dept: CARDIOLOGY | Facility: CLINIC | Age: 67
End: 2022-06-14
Payer: COMMERCIAL

## 2022-06-14 PROCEDURE — 93922 UPR/L XTREMITY ART 2 LEVELS: CPT

## 2022-06-14 PROCEDURE — 93978 VASCULAR STUDY: CPT

## 2022-06-22 ENCOUNTER — APPOINTMENT (OUTPATIENT)
Dept: CARDIOLOGY | Facility: CLINIC | Age: 67
End: 2022-06-22

## 2022-08-24 RX ORDER — TICAGRELOR 90 MG/1
90 TABLET ORAL TWICE DAILY
Qty: 180 | Refills: 1 | Status: ACTIVE | COMMUNITY
Start: 1900-01-01 | End: 1900-01-01

## 2022-11-14 NOTE — HISTORY OF PRESENT ILLNESS
[FreeTextEntry1] : 67F CAD/PCI presents for f/u\par interventional cards: Dr Simona Hector\par \par Previously,\par on 4/5/22, pt presented to [facility] with CP, found to have a trop 153 up to 279. EKG with anterolateral TWI. pt transferred to [facility] and underwent a cardiac cath, found to have a 90% LAD lesion, and received a 3.00 X 18 ANAIS ALYSHA, and a 2.75 X 12 ANAIS ALYSHA. pt to be continued on asa, and brilinta for one year per interventional cards recs. \par TTE showing a LV EF 55%\par pt seen in cardiology to establish outpt care 4/2022. At that time, pt feeling well overall without issues.\par last seen 6/22, feeling well. \par \par Pt now presents for f/u.\par today,\par \par \par \par \par home med list up to date?\par d/c brilinta today?\par \par \par \par \par \par \par \par \par \par \par \par pt overall feeling well. states that yesterday she thought her feet were swollen but currently are resolved. pt back to being active, not exercising. \par \par on dapt with asa and brilinta. plan for 6 mo brilinta course -through 11/1/22. \par otherwise pt with no complaints. denies sob, palpitations, syncope, diaphoresis. \par \par \par Exercise: none\par Diet: none\par \par Prior cardiac workup:\par Recent labs:\par  \par \par  \par Med hx: CAD/PCI\par OBGYN hx: one child, denies prev hx of preeclampsia, gest htn, gest dm, or peripartum cardiomyopathy. Currently post menopause. No hx of miscarriage. \par Sx hx: cholecystectomy\par Fam hx: no known cardiac hx\par Social hx: lives in [place] with son. has a twin sister who lives in [place]. [employer] service. no tob, etho, no drugs. \par Meds: asa brilinta (doesn’t remember the rest)\par Allergies: nkda\par

## 2022-11-14 NOTE — CARDIOLOGY SUMMARY
[de-identified] : 4/2022:\par Left Ventricle: The left ventricular internal cavity size is normal. \par Increased relative wall thickness with normal mass index consistent with \par left ventricular concentric remodeling.\par Global LV systolic function was normal. Left ventricular ejection \par fraction, by visual estimation, is 55 to 60%. Normal segmental left \par ventricular systolic function. E/A reversal consistent with reduced LV \par compliance.\par Right Ventricle: Normal right ventricular size and function.\par Left Atrium: The left atrium is normal in size.\par Right Atrium: The right atrium is normal in size.\par Pericardium: There is no evidence of pericardial effusion.\par Mitral Valve: Thickening of the anterior mitral valve leaflet. Trace \par mitral valve regurgitation is seen.\par Tricuspid Valve: The tricuspid valve is not well seen. Mild tricuspid \par regurgitation is visualized.\par Aortic Valve: The aortic valve was not well visualized. Peak transaortic \par gradient equals 8.9 mmHg, mean transaortic gradient equals 4.7 mmHg, the \par calculated aortic valve area equals 3.11 cm² by the continuity equation \par consistent with normally opening aortic valve. The aortic valve mean \par gradient is 4.7 mmHg consistent with normally opening aortic valve. \par Trivial aortic valve regurgitation is seen.\par Pulmonic Valve: The pulmonic valve was not well visualized. Mild pulmonic \par valve regurgitation.\par Aorta: Aortic root measured at Sinus of Valsalva is normal.\par Pulmonary Artery: The pulmonary artery is not well seen.\par  [de-identified] : 4/2022:\par Diagnostic Findings: \par \par Coronary Angiography \par The coronary circulation is right dominant.  \par \par LM \par Left main artery: Angiography shows no disease.  \par \par LAD \par Proximal left anterior descending: Angiography shows minor\par irregularities. Mid left anterior descending: There is a 90 %\par stenosis. Distal left anterior descending: Angiography shows minor\par irregularities.\par \par CX \par Circumflex: Angiography shows minor irregularities.  \par \par RCA \par Right coronary artery: Angiography shows minor irregularities.  \par

## 2022-11-16 ENCOUNTER — APPOINTMENT (OUTPATIENT)
Dept: CARDIOLOGY | Facility: CLINIC | Age: 67
End: 2022-11-16

## 2022-11-17 NOTE — ED ADULT TRIAGE NOTE - AS HEIGHT TYPE
[facility] 75  coding opportunities       Chart reviewed, no opportunity found: CHART REVIEWED, NO OPPORTUNITY FOUND        Patients Insurance     Medicare Insurance: [employer] [facility] [employer] [facility] Advantage stated

## 2022-12-01 ENCOUNTER — EMERGENCY (EMERGENCY)
Facility: HOSPITAL | Age: 67
LOS: 0 days | Discharge: HOME | End: 2022-12-02
Attending: STUDENT IN AN ORGANIZED HEALTH CARE EDUCATION/TRAINING PROGRAM
Payer: SELF-PAY

## 2022-12-01 VITALS
OXYGEN SATURATION: 99 % | HEIGHT: 65 IN | TEMPERATURE: 97 F | HEART RATE: 59 BPM | RESPIRATION RATE: 21 BRPM | SYSTOLIC BLOOD PRESSURE: 138 MMHG | WEIGHT: 149.91 LBS | DIASTOLIC BLOOD PRESSURE: 70 MMHG

## 2022-12-01 VITALS
OXYGEN SATURATION: 99 % | TEMPERATURE: 98 F | SYSTOLIC BLOOD PRESSURE: 145 MMHG | DIASTOLIC BLOOD PRESSURE: 79 MMHG | HEART RATE: 88 BPM | RESPIRATION RATE: 18 BRPM

## 2022-12-01 DIAGNOSIS — Z86.79 PERSONAL HISTORY OF OTHER DISEASES OF THE CIRCULATORY SYSTEM: ICD-10-CM

## 2022-12-01 DIAGNOSIS — I10 ESSENTIAL (PRIMARY) HYPERTENSION: ICD-10-CM

## 2022-12-01 DIAGNOSIS — Z90.49 ACQUIRED ABSENCE OF OTHER SPECIFIED PARTS OF DIGESTIVE TRACT: Chronic | ICD-10-CM

## 2022-12-01 DIAGNOSIS — R33.9 RETENTION OF URINE, UNSPECIFIED: ICD-10-CM

## 2022-12-01 DIAGNOSIS — Z79.82 LONG TERM (CURRENT) USE OF ASPIRIN: ICD-10-CM

## 2022-12-01 DIAGNOSIS — Z90.49 ACQUIRED ABSENCE OF OTHER SPECIFIED PARTS OF DIGESTIVE TRACT: ICD-10-CM

## 2022-12-01 DIAGNOSIS — N81.4 UTEROVAGINAL PROLAPSE, UNSPECIFIED: ICD-10-CM

## 2022-12-01 LAB
APPEARANCE UR: CLEAR — SIGNIFICANT CHANGE UP
BACTERIA # UR AUTO: NEGATIVE — SIGNIFICANT CHANGE UP
BILIRUB UR-MCNC: NEGATIVE — SIGNIFICANT CHANGE UP
COLOR SPEC: YELLOW — SIGNIFICANT CHANGE UP
DIFF PNL FLD: ABNORMAL
EPI CELLS # UR: NEGATIVE — SIGNIFICANT CHANGE UP
GLUCOSE UR QL: NEGATIVE MG/DL — SIGNIFICANT CHANGE UP
KETONES UR-MCNC: NEGATIVE — SIGNIFICANT CHANGE UP
LEUKOCYTE ESTERASE UR-ACNC: NEGATIVE — SIGNIFICANT CHANGE UP
NITRITE UR-MCNC: NEGATIVE — SIGNIFICANT CHANGE UP
PH UR: 6 — SIGNIFICANT CHANGE UP (ref 5–8)
PROT UR-MCNC: 15 MG/DL
RBC CASTS # UR COMP ASSIST: NEGATIVE /HPF — SIGNIFICANT CHANGE UP (ref 0–4)
SP GR SPEC: 1.01 — SIGNIFICANT CHANGE UP (ref 1.01–1.02)
UROBILINOGEN FLD QL: NEGATIVE MG/DL — SIGNIFICANT CHANGE UP
WBC UR QL: NEGATIVE — SIGNIFICANT CHANGE UP

## 2022-12-01 PROCEDURE — 99284 EMERGENCY DEPT VISIT MOD MDM: CPT

## 2022-12-01 NOTE — ED PROVIDER NOTE - PATIENT PORTAL LINK FT
You can access the FollowMyHealth Patient Portal offered by [facility] by registering at the following website: [email or web address]. By joining [employer]’s FollowMyHealth portal, you will also be able to view your health information using other applications (apps) compatible with our system.

## 2022-12-01 NOTE — ED ADULT TRIAGE NOTE - CHIEF COMPLAINT QUOTE
complaining of pain on the lower abdomen as per pt she was not able to urinate since 4pm. pt unable to walk and sit still. h/o cardiac arrest as per pt April 2022. complaining of pain on the lower abdomen as per pt she was not able to urinate since 4pm. pt unable to walk and sit still. h/o cardiac arrest as per pt April 2022. denies trauma, lower back pain.

## 2022-12-01 NOTE — ED PROVIDER NOTE - NSFOLLOWUPINSTRUCTIONS_ED_ALL_ED_FT
Follow up with gynecologist   Rest, drink plenty of fluids  Advance activity as tolerated  Continue all previously prescribed medications as directed  Follow up with your PMD - bring copies of your results  Return to the ER for severe pain, inability to reduce prolapse, inability to urinate, or other new or concerning symptoms

## 2022-12-01 NOTE — ED PROVIDER NOTE - CARE PROVIDER_API CALL
Amber Laguerre  OBSTETRICS AND GYNECOLOGY  [address] [place] [address]  Phone: [phone]  Fax: [phone]  Follow Up Time:     Sergei Villalobos)  Obstetrics and Gynecology  [facility] [facility] [facility], [address]th [place] [address]  Phone: [phone]  Fax: [phone]  Follow Up Time:

## 2022-12-01 NOTE — ED ADULT TRIAGE NOTE - GLASGOW COMA SCALE: SCORE, MLM
15 My signature below certifies that the above stated patient is homebound and upon completion of the Face-To-Face encounter, has the need for intermittent skilled nursing, physical therapy and/or speech or occupational therapy services in their home for their current diagnosis as outlined in their initial plan of care. These services will continue to be monitored by myself or another physician.

## 2022-12-01 NOTE — ED PROVIDER NOTE - PHYSICAL EXAMINATION
General appearance: Nontoxic appearing, conversant, afebrile    Eyes: anicteric sclerae, VANDANA, EOMI   HENT: Atraumatic; oropharynx clear, MMM and no ulcerations, no pharyngeal erythema or exudate   Neck: Trachea midline; Full range of motion, supple   Pulm: CTA bl, normal respiratory effort and no intercostal retractions, normal work of breathing   CV: RRR, No murmurs, rubs, or gallops   Abdomen: Soft, non-tender, non-distended; no guarding or rebound   Gyn: chaperone Gabi RN, prolapse of cervix through vaginal introitus   Extremities: No peripheral edema, no gross deformities, FROM x4   Skin: Dry, normal temperature, turgor and texture; no rash   Psych: Appropriate affect, cooperative

## 2022-12-01 NOTE — ED PROVIDER NOTE - PROGRESS NOTE DETAILS
Chato: Prolapse reduced, Patient able to urinate after reduction with resolution in symptoms.  Will dc with gyn outpatient.

## 2022-12-01 NOTE — ED PROVIDER NOTE - CLINICAL SUMMARY MEDICAL DECISION MAKING FREE TEXT BOX
Presenting with uterine prolapse likely cause of urinary retention.  Reduced with resolution of symptoms.  Will check urine eval uti and dc with gyn follow up.

## 2022-12-01 NOTE — ED PROVIDER NOTE - OBJECTIVE STATEMENT
68yo female with pmh htn presenting with urinary retention.  Started shortly pta, has been unable to urinate for first time since 4p.  Upon exam, patient has uterine prolapse which she states has been out for some time but does not like going to doctors and has not had urinary issues until now.  No fevers, dysuria.

## 2022-12-01 NOTE — ED ADULT NURSE NOTE - CHIEF COMPLAINT QUOTE
complaining of pain on the lower abdomen as per pt she was not able to urinate since 4pm. pt unable to walk and sit still. h/o cardiac arrest as per pt April 2022. denies trauma, lower back pain.

## 2022-12-03 LAB
CULTURE RESULTS: SIGNIFICANT CHANGE UP
SPECIMEN SOURCE: SIGNIFICANT CHANGE UP

## 2022-12-22 NOTE — H&P CARDIOLOGY - BIRTH SEX
PALLIATIVE MEDICINE INTERDISCIPLINARY TEAM NOTE    Provider:  [   ]Social Work   [   ]          [   ] Initial visit [   ] Follow up    Family or contact name / phone #   Met with: [   ] Patient  [   ] Family  [   ] Other:    Primary Language: [   ] English [   ] Other*:                      *Interpretation provided by:    SUPPORT DIAGNOSES            (Check all that apply)  [   ] Psychosocial spiritual assessment (PSSA)  [   ] EOL issues  [   ] Cultural / spiritual concerns  [   ] Pain / suffering  [   ] Dementia / AMS  [   ] Other:  [   ] AD issues  [   ] Grief / loss / sadness  [   ] Discharge issues  [   ] Distress / coping    PSYCHOSOCIAL ASSESSMENT OF PATIENT         (Check all that apply)  [   ] Initial Assessment            [   ] Reassessment          [   ] Not Applicable this visit    Pain/suffering acuity:  [   ] None to mild (0-3)           [   ] Moderate (4-6)        [   ] High (7-10)    Mental Status:  [   ] Alert/oriented (x3)          [   ] Confused/Altered(x2/x1)         [   ] Non-resp    Functional status:  [   ] Independent w ADLs      [   ] Needs Assistance             [   ] Bedbound/Full Care    Coping:  [   ] Coping well                     [   ] Coping w/difficulty            [   ] Poor coping    Support system:  [   ] Strong                              [   ] Adequate                        [   ] Inadequate    SPIRITUAL ASSESSMENT  Nondenominational/Spiritual practice: __Catholic_________________________    Role of organized Zoroastrianism:  [   ] Important                     [   ] Some (fam tradition, cultural)               [   ] None    Effects on medical care:  [   ] Yes, _____________________________________                         [   ] None    Cultural/Worship need:  [   ] Yes, _____________________________________                         [   ] None    Refer to Pastoral Care:  [   ] Yes           [   ] No, not at this time    SERVICE PROVIDED  [   ]PSSA                                                                             [   ]Discharge support / facilitation  [   ]AD / goals of care counseling                                  [   ]EOL / death / bereavement counseling  [   ]Counseling / support                                                [   ] Family meeting  [   ]Prayer / sacrament / ritual                                      [   ] Referral   [ x  ]Other                                                                       NOTE and Plan of Care (PoC): Pt looked comfortable, no family members were present at time of visit. I will follow up as needed. Female

## 2023-05-10 NOTE — DISCHARGE NOTE PROVIDER - CARE PROVIDER_API CALL
No Demarcus Hector)  Cardiology; Interventional Cardiology  [address] [place], [place] [id number]  Phone: [phone]  Fax: [phone]  Established Patient  Follow Up Time: 2 weeks   Demarcus Hector)  Cardiology; Interventional Cardiology  [address] [place], [place] [id number]  Phone: [phone]  Fax: [phone]  Established Patient  Follow Up Time: 2 weeks    Sofie Mitchell)  Female Pelvic MedReconst Surg; Obstetrics and Gynecology  [address] [facility] [address]  [place] [address]  Phone: [phone]  Fax: [phone]  Follow Up Time: 1-3 days